# Patient Record
Sex: MALE | Race: WHITE | Employment: FULL TIME | URBAN - NONMETROPOLITAN AREA
[De-identification: names, ages, dates, MRNs, and addresses within clinical notes are randomized per-mention and may not be internally consistent; named-entity substitution may affect disease eponyms.]

---

## 2021-08-19 ENCOUNTER — OFFICE VISIT (OUTPATIENT)
Dept: PRIMARY CARE CLINIC | Age: 46
End: 2021-08-19
Payer: COMMERCIAL

## 2021-08-19 VITALS
WEIGHT: 233 LBS | SYSTOLIC BLOOD PRESSURE: 130 MMHG | TEMPERATURE: 97.5 F | BODY MASS INDEX: 30.88 KG/M2 | HEART RATE: 74 BPM | HEIGHT: 73 IN | OXYGEN SATURATION: 99 % | DIASTOLIC BLOOD PRESSURE: 66 MMHG

## 2021-08-19 DIAGNOSIS — M54.2 MUSCULOSKELETAL NECK PAIN: ICD-10-CM

## 2021-08-19 DIAGNOSIS — Z76.89 ESTABLISHING CARE WITH NEW DOCTOR, ENCOUNTER FOR: Primary | ICD-10-CM

## 2021-08-19 DIAGNOSIS — R53.83 FATIGUE, UNSPECIFIED TYPE: ICD-10-CM

## 2021-08-19 DIAGNOSIS — Z83.3 FAMILY HISTORY OF DIABETES MELLITUS: ICD-10-CM

## 2021-08-19 PROCEDURE — 99203 OFFICE O/P NEW LOW 30 MIN: CPT | Performed by: STUDENT IN AN ORGANIZED HEALTH CARE EDUCATION/TRAINING PROGRAM

## 2021-08-19 RX ORDER — TIZANIDINE 2 MG/1
2 TABLET ORAL NIGHTLY PRN
Qty: 10 TABLET | Refills: 0 | Status: SHIPPED | OUTPATIENT
Start: 2021-08-19

## 2021-08-19 ASSESSMENT — PATIENT HEALTH QUESTIONNAIRE - PHQ9
SUM OF ALL RESPONSES TO PHQ QUESTIONS 1-9: 0
1. LITTLE INTEREST OR PLEASURE IN DOING THINGS: 0
SUM OF ALL RESPONSES TO PHQ9 QUESTIONS 1 & 2: 0
2. FEELING DOWN, DEPRESSED OR HOPELESS: 0
SUM OF ALL RESPONSES TO PHQ QUESTIONS 1-9: 0
SUM OF ALL RESPONSES TO PHQ QUESTIONS 1-9: 0

## 2021-08-19 NOTE — PROGRESS NOTES
[address] [facility]  [address] [place] [address] S[place] Solis [address]  Dept: [phone]  Dept Fax: [phone]  Loc: [phone]      Subjective:     Chief Complaint   Patient presents with    New Patient    Shoulder Pain       HPI:  Silvestre Khan is a 40 y.o. male presenting for    Here to establish care. No known chronic medical problems. Doesn't take any medications. H/o lower lumbar discectomy. Doesn't smoke, moderate etoh use. No recreational drugs. Other complaint is shoulder pain as noted below. R shoulder problem  -Started yesterday. No trauma. No previous injury  -Movement makes worse  -Alleviated by rest  -Neck is stiff as well  -Took some ibuprofen last night  -Is right handed  -Works in food processing      ROS:   Review of Systems  Reviewed with patient and noted to be negative except that listed in HPI    PMHx:  No past medical history on file. There is no problem list on file for this patient. 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 Objective:   PE:  /66   Pulse 74   Temp 97.5 °F (36.4 °C)   Ht 6' 1\" (1.854 m)   Wt 233 lb (105.7 kg)   SpO2 99%   BMI 30.74 kg/m²   Physical Exam  Constitutional:       General: He is not in acute distress. Appearance: Normal appearance. HENT:      Head: Normocephalic. Nose: Nose normal.      Mouth/Throat:      Mouth: Mucous membranes are moist.      Pharynx: Oropharynx is clear. No oropharyngeal exudate or posterior oropharyngeal erythema. Eyes:      General: No scleral icterus. Extraocular Movements: Extraocular movements intact. Conjunctiva/sclera: Conjunctivae normal.   Cardiovascular:      Rate and Rhythm: Normal rate and regular rhythm. Pulses: Normal pulses. Heart sounds: No murmur heard. Pulmonary:      Effort: Pulmonary effort is normal.      Breath sounds: Normal breath sounds. Abdominal:      General: There is no distension. Palpations: Abdomen is soft. There is no mass. Tenderness: There is no abdominal tenderness. Musculoskeletal:         General: Normal range of motion. Cervical back: Normal range of motion. Comments: Area of indicated tenderness is R upper trapezius group, not shoulder. Provocative shoulder exams negative and do not reproduce sx. R shoulder FROM   Skin:     General: Skin is warm and dry. Capillary Refill: Capillary refill takes less than 2 seconds. Neurological:      General: No focal deficit present. Mental Status: He is alert and oriented to person, place, and time. Psychiatric:         Mood and Affect: Mood normal.         Behavior: Behavior normal.         Thought Content: Thought content normal.            Assessment & Plan   Agnes Patel was seen today for new patient and shoulder pain.     Diagnoses and all orders for this visit:    Establishing care with new doctor, encounter for    Musculoskeletal neck pain  -Pain appears more muscular in upper back/neck rather than shoulder. Treat w/ APAP, NSAID, muscle relaxer, stretches, warm pad. -     tiZANidine (ZANAFLEX) 2 MG tablet; Take 1 tablet by mouth nightly as needed (muscle spasms)    Family history of diabetes mellitus  -     Hemoglobin A1C; Future    Fatigue, unspecified type  -     Comprehensive Metabolic Panel; Future  -     Lipid, Fasting; Future  -     Hemoglobin A1C; Future  -     TSH WITH REFLEX TO FT4; Future  -     CBC Auto Differential; Future      Return for f/u 1-2 weeks for lab discussion/neck f/u. All questions were answered. Medications, including possible adverse effects, and instructions were reviewed and  understanding was confirmed. Follow-up recommendations, including when to contact or return to office (ie; if symptoms worsen or fail to improve), were discussed and acknowledged.     Electronically signed by Ted Amezcua MD on 8/19/21 at 1:35 PM CDT

## 2021-08-19 NOTE — PATIENT INSTRUCTIONS
Tylenol: max dose 3000mg daily  Ibuprofen max dose 2400mg daily  Take zanaflex in evening  Try warm compress  Patient Education        Neck: Exercises  Introduction  Here are some examples of exercises for you to try. The exercises may be suggested for a condition or for rehabilitation. Start each exercise slowly. Ease off the exercises if you start to have pain. You will be told when to start these exercises and which ones will work best for you. How to do the exercises  Neck stretch   1. This stretch works best if you keep your shoulder down as you lean away from it. To help you remember to do this, start by relaxing your shoulders and lightly holding on to your thighs or your chair. 2. Tilt your head toward your shoulder and hold for 15 to 30 seconds. Let the weight of your head stretch your muscles. 3. If you would like a little added stretch, use your hand to gently and steadily pull your head toward your shoulder. For example, keeping your right shoulder down, lean your head to the left. 4. Repeat 2 to 4 times toward each shoulder. Diagonal neck stretch   1. Turn your head slightly toward the direction you will be stretching, and tilt your head diagonally toward your chest and hold for 15 to 30 seconds. 2. If you would like a little added stretch, use your hand to gently and steadily pull your head forward on the diagonal.  3. Repeat 2 to 4 times toward each side. Dorsal glide stretch   The dorsal glide stretches the back of the neck. If you feel pain, do not glide so far back. Some people find this exercise easier to do while lying on their backs with an ice pack on the neck. 1. Sit or stand tall and look straight ahead. 2. Slowly tuck your chin as you glide your head backward over your body  3. Hold for a count of 6, and then relax for up to 10 seconds. 4. Repeat 8 to 12 times. Chest and shoulder stretch   1.  Sit or stand tall and glide your head backward as in the dorsal glide stretch. 2. Raise both arms so that your hands are next to your ears. 3. Take a deep breath, and as you breathe out, lower your elbows down and behind your back. You will feel your shoulder blades slide down and together, and at the same time you will feel a stretch across your chest and the front of your shoulders. 4. Hold for about 6 seconds, and then relax for up to 10 seconds. 5. Repeat 8 to 12 times. Strengthening: Hands on head   1. Move your head backward, forward, and side to side against gentle pressure from your hands, holding each position for about 6 seconds. 2. Repeat 8 to 12 times. Follow-up care is a key part of your treatment and safety. Be sure to make and go to all appointments, and call your doctor if you are having problems. It's also a good idea to know your test results and keep a list of the medicines you take. Where can you learn more? Go to [email or web address]://[email or web address] org and sign in to your [employer] account. Enter P975 in the [employer] box to learn more about \"Neck: Exercises. \"     If you do not have an account, please click on the \"Sign Up Now\" link. Current as of: November 16, 2020               Content Version: 12.9  © 2006-2021 [employer]. Care instructions adapted under license by [facility] ([facility]). If you have questions about a medical condition or this instruction, always ask your healthcare professional. [place] [address] any warranty or liability for your use of this information.

## 2023-12-14 ENCOUNTER — TELEPHONE (OUTPATIENT)
Dept: HEMATOLOGY | Age: 48
End: 2023-12-14

## 2023-12-14 NOTE — TELEPHONE ENCOUNTER
Called pt. to remind them of appointment on 12/15/23 and had to leave a detailed voicemail with appointment date and time.

## 2023-12-14 NOTE — PROGRESS NOTES
nodule  Continue to follow with other medical providers as recommended  Labs at next visit: CBC, CMP and ferritin level    EMR Dragon/Transcription disclaimer:   Much of this encounter note is an electronic transcription/translation of spoken language to printed text. The electronic translation of spoken language may permit erroneous, or at times, nonsensical words or phrases to be inadvertently transcribed; although attempts have made to review the note for such errors, some may still exist.  Please excuse any unrecognized transcription errors and contact us if the error is unintelligible or needs documented correction. Also, portions of this note have been copied forward, however, changed to reflect the most current clinical status of this patient. I spent a total of 74 minutes on this encounter on 12/15/2023 to include review of labs, radiographic imaging, previous provider documentation, performing medically appropriate examination, ordering tests, ordering medication, patient education and documenting clinical information in the electronic medical record. Electronically signed by ASHLEY Tate on 12/15/2023 at 4:56 PM  Ana BULL am starting this note as a registered nurse for [facility] ASHLEY Maza.

## 2023-12-15 ENCOUNTER — OFFICE VISIT (OUTPATIENT)
Dept: HEMATOLOGY | Age: 48
End: 2023-12-15
Payer: OTHER GOVERNMENT

## 2023-12-15 ENCOUNTER — HOSPITAL ENCOUNTER (OUTPATIENT)
Dept: INFUSION THERAPY | Age: 48
Discharge: HOME | End: 2023-12-15
Payer: OTHER GOVERNMENT

## 2023-12-15 VITALS
SYSTOLIC BLOOD PRESSURE: 128 MMHG | HEART RATE: 70 BPM | HEIGHT: 72 IN | DIASTOLIC BLOOD PRESSURE: 82 MMHG | BODY MASS INDEX: 32.18 KG/M2 | OXYGEN SATURATION: 99 % | WEIGHT: 237.6 LBS | TEMPERATURE: 98.1 F

## 2023-12-15 DIAGNOSIS — R79.89 ELEVATED FERRITIN: ICD-10-CM

## 2023-12-15 DIAGNOSIS — R53.83 OTHER FATIGUE: ICD-10-CM

## 2023-12-15 DIAGNOSIS — I82.432 ACUTE DEEP VEIN THROMBOSIS (DVT) OF POPLITEAL VEIN OF LEFT LOWER EXTREMITY (HCC): ICD-10-CM

## 2023-12-15 DIAGNOSIS — R91.1 NODULE OF UPPER LOBE OF RIGHT LUNG: ICD-10-CM

## 2023-12-15 DIAGNOSIS — I82.432 ACUTE DEEP VEIN THROMBOSIS (DVT) OF POPLITEAL VEIN OF LEFT LOWER EXTREMITY (HCC): Primary | ICD-10-CM

## 2023-12-15 LAB
ALBUMIN SERPL-MCNC: 4.9 G/DL (ref 3.5–5.2)
ALP SERPL-CCNC: 82 U/L (ref 40–130)
ALT SERPL-CCNC: 63 U/L (ref 21–72)
ANION GAP SERPL CALCULATED.3IONS-SCNC: 10 MMOL/L (ref 7–19)
AST SERPL-CCNC: 49 U/L (ref 17–59)
BASOPHILS # BLD: 0.02 K/UL (ref 0.01–0.08)
BASOPHILS NFR BLD: 0.4 % (ref 0.1–1.2)
BILIRUB SERPL-MCNC: 1.2 MG/DL (ref 0.2–1.3)
BUN SERPL-MCNC: 16 MG/DL (ref 9–20)
CALCIUM SERPL-MCNC: 9.2 MG/DL (ref 8.4–10.2)
CHLORIDE SERPL-SCNC: 103 MMOL/L (ref 98–111)
CO2 SERPL-SCNC: 28 MMOL/L (ref 22–29)
CREAT SERPL-MCNC: 0.8 MG/DL (ref 0.6–1.2)
CRP SERPL HS-MCNC: 0.42 MG/DL (ref 0–0.5)
D DIMER PPP FEU-MCNC: 1.76 UG/ML FEU (ref 0–0.48)
EOSINOPHIL # BLD: 0.06 K/UL (ref 0.04–0.54)
EOSINOPHIL NFR BLD: 1.1 % (ref 0.7–7)
ERYTHROCYTE [DISTWIDTH] IN BLOOD BY AUTOMATED COUNT: 12.6 % (ref 11.6–14.4)
ERYTHROCYTE [SEDIMENTATION RATE] IN BLOOD BY WESTERGREN METHOD: 20 MM/HR (ref 0–10)
FERRITIN SERPL-MCNC: 491 NG/ML (ref 30–400)
FOLATE SERPL-MCNC: 6.9 NG/ML (ref 4.5–32.2)
GLOBULIN: 3.9 G/DL
GLUCOSE SERPL-MCNC: 125 MG/DL (ref 74–106)
HCT VFR BLD AUTO: 44.4 % (ref 40.1–51)
HGB BLD-MCNC: 14.9 G/DL (ref 13.7–17.5)
IRON SATN MFR SERPL: 32 % (ref 14–50)
IRON SERPL-MCNC: 96 UG/DL (ref 59–158)
LYMPHOCYTES # BLD: 2.41 K/UL (ref 1.18–3.74)
LYMPHOCYTES NFR BLD: 45.2 % (ref 19.3–53.1)
MCH RBC QN AUTO: 26.3 PG (ref 25.7–32.2)
MCHC RBC AUTO-ENTMCNC: 33.6 G/DL (ref 32.3–36.5)
MCV RBC AUTO: 78.4 FL (ref 79–92.2)
MONOCYTES # BLD: 0.45 K/UL (ref 0.24–0.82)
MONOCYTES NFR BLD: 8.4 % (ref 4.7–12.5)
NEUTROPHILS # BLD: 2.38 K/UL (ref 1.56–6.13)
NEUTS SEG NFR BLD: 44.7 % (ref 34–71.1)
PLATELET # BLD AUTO: 162 K/UL (ref 163–337)
PMV BLD AUTO: 10.6 FL (ref 7.4–10.4)
POTASSIUM SERPL-SCNC: 4.6 MMOL/L (ref 3.5–5.1)
PROT SERPL-MCNC: 8.7 G/DL (ref 6.3–8.2)
RBC # BLD AUTO: 5.66 M/UL (ref 4.63–6.08)
SODIUM SERPL-SCNC: 141 MMOL/L (ref 137–145)
TIBC SERPL-MCNC: 298 UG/DL (ref 250–400)
VIT B12 SERPL-MCNC: 683 PG/ML (ref 211–946)
WBC # BLD AUTO: 5.33 K/UL (ref 4.23–9.07)

## 2023-12-15 PROCEDURE — 85025 COMPLETE CBC W/AUTO DIFF WBC: CPT

## 2023-12-15 PROCEDURE — 80053 COMPREHEN METABOLIC PANEL: CPT

## 2023-12-15 PROCEDURE — 36415 COLL VENOUS BLD VENIPUNCTURE: CPT

## 2023-12-15 PROCEDURE — 99205 OFFICE O/P NEW HI 60 MIN: CPT | Performed by: NURSE PRACTITIONER

## 2023-12-15 RX ORDER — RIVAROXABAN 15 MG/1
TABLET, FILM COATED ORAL
COMMUNITY
Start: 2023-12-08

## 2023-12-15 ASSESSMENT — PROMIS GLOBAL HEALTH SCALE
IN GENERAL, HOW WOULD YOU RATE YOUR PHYSICAL HEALTH [ON A SCALE OF 1 (POOR) TO 5 (EXCELLENT)]?: 2
IN THE PAST 7 DAYS, HOW OFTEN HAVE YOU BEEN BOTHERED BY EMOTIONAL PROBLEMS, SUCH AS FEELING ANXIOUS, DEPRESSED, OR IRRITABLE [ON A SCALE FROM 1 (NEVER) TO 5 (ALWAYS)]?: 5
IN THE PAST 7 DAYS, HOW WOULD YOU RATE YOUR PAIN ON AVERAGE [ON A SCALE FROM 0 (NO PAIN) TO 10 (WORST IMAGINABLE PAIN)]?: 8
IN GENERAL, HOW WOULD YOU RATE YOUR SATISFACTION WITH YOUR SOCIAL ACTIVITIES AND RELATIONSHIPS [ON A SCALE OF 1 (POOR) TO 5 (EXCELLENT)]?: 4
IN THE PAST 7 DAYS, HOW WOULD YOU RATE YOUR FATIGUE ON AVERAGE [ON A SCALE FROM 1 (NONE) TO 5 (VERY SEVERE)]?: 3
SUM OF RESPONSES TO QUESTIONS 3, 6, 7, & 8: 18
IN GENERAL, WOULD YOU SAY YOUR QUALITY OF LIFE IS...[ON A SCALE OF 1 (POOR) TO 5 (EXCELLENT)]: 2
TO WHAT EXTENT ARE YOU ABLE TO CARRY OUT YOUR EVERYDAY PHYSICAL ACTIVITIES SUCH AS WALKING, CLIMBING STAIRS, CARRYING GROCERIES, OR MOVING A CHAIR [ON A SCALE OF 1 (NOT AT ALL) TO 5 (COMPLETELY)]?: 5
SUM OF RESPONSES TO QUESTIONS 2, 4, 5, & 10: 15
IN GENERAL, PLEASE RATE HOW WELL YOU CARRY OUT YOUR USUAL SOCIAL ACTIVITIES (INCLUDES ACTIVITIES AT HOME, AT WORK, AND IN YOUR COMMUNITY, AND RESPONSIBILITIES AS A PARENT, CHILD, SPOUSE, EMPLOYEE, FRIEND, ETC) [ON A SCALE OF 1 (POOR) TO 5 (EXCELLENT)]?: 4
IN GENERAL, WOULD YOU SAY YOUR HEALTH IS...[ON A SCALE OF 1 (POOR) TO 5 (EXCELLENT)]: 3
IN GENERAL, HOW WOULD YOU RATE YOUR MENTAL HEALTH, INCLUDING YOUR MOOD AND YOUR ABILITY TO THINK [ON A SCALE OF 1 (POOR) TO 5 (EXCELLENT)]?: 4

## 2023-12-22 LAB
APCR PPP: 4.36 {RATIO}
APTT IMM NP PPP: 90 SEC (ref 32–48)
APTT P HEP NEUT PPP: ABNORMAL SEC (ref 32–48)
AT III ACT/NOR PPP CHRO: 106 % (ref 76–128)
B2 GLYCOPROT1 IGG SERPL IA-ACNC: 65 SGU
B2 GLYCOPROT1 IGM SERPL IA-ACNC: <10 SMU
CARDIOLIPIN IGG SER IA-ACNC: 46 GPL
CARDIOLIPIN IGM SER IA-ACNC: 11 MPL
CONFIRM APTT STACLOT: ABNORMAL
DRVVT SCREEN TO CONFIRM RATIO: POSITIVE RATIO
F2 C.20210G>A GENO BLD/T: NEGATIVE
F5 P.R506Q BLD/T QL: ABNORMAL
HCYS SERPL-SCNC: 11 UMOL/L (ref 0–15)
LA 3 SCREEN W REFLEX-IMP: ABNORMAL
LA NT DPL PPP QL: POSITIVE
MIXING DRVVT: 93 SEC (ref 33–44)
PROT C ACT/NOR PPP: 136 % (ref 83–168)
PROT S FREE AG ACT/NOR PPP IA: 96 % (ref 74–147)
PROTHROMBIN TIME: 17.1 SEC (ref 12–15.5)
REPTILASE TIME: ABNORMAL SEC
SCREEN APTT: 146 SEC (ref 32–48)
SCREEN DRVVT: 139 SEC (ref 33–44)
SPECIMEN SOURCE: ABNORMAL
SPECIMEN SOURCE: ABNORMAL
THROMBIN TIME: 18.3 SEC (ref 14.7–19.5)
THROMBOSIS INTERPRETATION: ABNORMAL

## 2023-12-26 ENCOUNTER — TELEPHONE (OUTPATIENT)
Dept: HEMATOLOGY | Age: 48
End: 2023-12-26

## 2023-12-26 ENCOUNTER — HOSPITAL ENCOUNTER (OUTPATIENT)
Dept: INFUSION THERAPY | Age: 48
Discharge: HOME | End: 2023-12-26
Payer: OTHER GOVERNMENT

## 2023-12-26 DIAGNOSIS — Z79.01 MONITORING FOR LONG-TERM ANTICOAGULANT USE: ICD-10-CM

## 2023-12-26 DIAGNOSIS — Z51.81 MONITORING FOR LONG-TERM ANTICOAGULANT USE: ICD-10-CM

## 2023-12-26 DIAGNOSIS — D68.61 ANTIPHOSPHOLIPID SYNDROME (HCC): Primary | ICD-10-CM

## 2023-12-26 DIAGNOSIS — I82.432 ACUTE DEEP VEIN THROMBOSIS (DVT) OF POPLITEAL VEIN OF LEFT LOWER EXTREMITY (HCC): ICD-10-CM

## 2023-12-26 DIAGNOSIS — D68.61 ANTIPHOSPHOLIPID SYNDROME (HCC): ICD-10-CM

## 2023-12-26 PROCEDURE — 85610 PROTHROMBIN TIME: CPT

## 2023-12-26 RX ORDER — ENOXAPARIN SODIUM 100 MG/ML
100 INJECTION SUBCUTANEOUS 2 TIMES DAILY
Qty: 10 EACH | Refills: 1 | Status: SHIPPED | OUTPATIENT
Start: 2023-12-26 | End: 2023-12-28 | Stop reason: SDUPTHER

## 2023-12-26 RX ORDER — WARFARIN SODIUM 5 MG/1
5 TABLET ORAL DAILY
Qty: 30 TABLET | Refills: 3 | Status: SHIPPED | OUTPATIENT
Start: 2023-12-26 | End: 2023-12-28 | Stop reason: SDUPTHER

## 2023-12-26 NOTE — PROGRESS NOTES
Spoke with Mr. David Hidalgo related to lab results indicating antiphospholipid syndrome. Discussed the need to be on chronic anticoagulation for life with his unprovoked DVT and the finding of antiphospholipid syndrome, positive lupus anticoagulant beta IgG and anticardiolipin IgG. Will need to discontinue Xarelto and begin bridging with Lovenox to Coumadin. Electronic prescription sent for Lovenox 100 mg subcu every 12 hours x 5 days along with Coumadin 5 mg daily. Mr. David Hidalgo will come to the clinic today at 4:00 for an INR and KATHY Mitchell will provide him with Coumadin teaching/INR monitoring.

## 2023-12-26 NOTE — TELEPHONE ENCOUNTER
Attempted to contact . Kat Kaufman related to his lab results indicating positive for lupus anticoagulant with an elevated beta-2 glycoprotein and anticardiolipin antibody, this suggest a diagnosis of antiphospholipid syndrome. Unfortunately anticoagulation with Xarelto is not recommended with antiphospholipid syndrome and he will need to transition to Coumadin. Will need to obtain an INR and then began Lovenox with Coumadin bridging and discontinue Xarelto.

## 2023-12-28 DIAGNOSIS — I82.432 ACUTE DEEP VEIN THROMBOSIS (DVT) OF POPLITEAL VEIN OF LEFT LOWER EXTREMITY (HCC): ICD-10-CM

## 2023-12-28 DIAGNOSIS — D68.61 ANTIPHOSPHOLIPID SYNDROME (HCC): ICD-10-CM

## 2023-12-28 RX ORDER — ENOXAPARIN SODIUM 100 MG/ML
100 INJECTION SUBCUTANEOUS 2 TIMES DAILY
Qty: 10 EACH | Refills: 1 | Status: SHIPPED | OUTPATIENT
Start: 2023-12-28

## 2023-12-28 RX ORDER — WARFARIN SODIUM 5 MG/1
5 TABLET ORAL DAILY
Qty: 30 TABLET | Refills: 3 | Status: SHIPPED | OUTPATIENT
Start: 2023-12-28

## 2024-01-02 ENCOUNTER — HOSPITAL ENCOUNTER (OUTPATIENT)
Dept: INFUSION THERAPY | Age: 49
Discharge: HOME | End: 2024-01-02
Payer: OTHER GOVERNMENT

## 2024-01-02 ENCOUNTER — ANTI-COAG VISIT (OUTPATIENT)
Dept: HEMATOLOGY | Age: 49
End: 2024-01-02
Payer: COMMERCIAL

## 2024-01-02 DIAGNOSIS — Z51.81 MONITORING FOR LONG-TERM ANTICOAGULANT USE: ICD-10-CM
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
## 2024-01-02 DIAGNOSIS — D68.61 ANTIPHOSPHOLIPID SYNDROME (HCC): ICD-10-CM
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
## 2024-01-02 DIAGNOSIS — S80.861A TICK BITE OF RIGHT LOWER LEG, INITIAL ENCOUNTER: ICD-10-CM

## 2024-01-02 DIAGNOSIS — R76.0 ANTIPHOSPHOLIPID ANTIBODY POSITIVE: Primary | ICD-10-CM

## 2024-01-02 DIAGNOSIS — Z79.01 MONITORING FOR LONG-TERM ANTICOAGULANT USE: ICD-10-CM

## 2024-01-02 DIAGNOSIS — S80.861A TICK BITE OF RIGHT LOWER LEG, INITIAL ENCOUNTER: Primary | ICD-10-CM

## 2024-01-02 LAB
INTERNATIONAL NORMALIZATION RATIO, POC: 2.2
PROTHROMBIN TIME, POC: NORMAL

## 2024-01-02 PROCEDURE — 93793 ANTICOAG MGMT PT WARFARIN: CPT | Performed by: NURSE PRACTITIONER

## 2024-01-02 PROCEDURE — 85610 PROTHROMBIN TIME: CPT | Performed by: NURSE PRACTITIONER

## 2024-01-02 NOTE — PROGRESS NOTES
Anticoagulation Encounter     Indication for Warfarin/Diagnosis for anticoagulation -antiphospholipid syndrome and unprovoked left lower extremity DVT  INR -2.2  Goal INR- 2-3  Current dose -Lovenox 100 mg SQ every 12 hours and Coumadin 5 mg nightly  Patient instructions -discontinue Lovenox and continue Coumadin 5 mg p.o. nightly  Next PT/INR  - 1 week    Stephen Forman RN discussed recommendations with patient.

## 2024-01-04 LAB
B BURGDOR IGG SER QL IB: NEGATIVE
B BURGDOR IGM SER QL IB: NEGATIVE

## 2024-01-09 ENCOUNTER — ANTI-COAG VISIT (OUTPATIENT)
Dept: HEMATOLOGY | Age: 49
End: 2024-01-09
Payer: COMMERCIAL

## 2024-01-09 ENCOUNTER — HOSPITAL ENCOUNTER (OUTPATIENT)
Dept: INFUSION THERAPY | Age: 49
Discharge: HOME | End: 2024-01-09
Payer: OTHER GOVERNMENT

## 2024-01-09 DIAGNOSIS — R76.0 ANTIPHOSPHOLIPID ANTIBODY POSITIVE: Primary | ICD-10-CM

## 2024-01-09 DIAGNOSIS — Z51.81 MONITORING FOR LONG-TERM ANTICOAGULANT USE: ICD-10-CM

## 2024-01-09 DIAGNOSIS — I82.432 ACUTE DEEP VEIN THROMBOSIS (DVT) OF POPLITEAL VEIN OF LEFT LOWER EXTREMITY (HCC): ICD-10-CM

## 2024-01-09 DIAGNOSIS — R79.89 ELEVATED FERRITIN: ICD-10-CM

## 2024-01-09 DIAGNOSIS — D68.61 ANTIPHOSPHOLIPID SYNDROME (HCC): ICD-10-CM

## 2024-01-09 DIAGNOSIS — Z79.01 MONITORING FOR LONG-TERM ANTICOAGULANT USE: ICD-10-CM

## 2024-01-09 LAB
INTERNATIONAL NORMALIZATION RATIO, POC: 3.2
PROTHROMBIN TIME, POC: NORMAL

## 2024-01-09 PROCEDURE — 93793 ANTICOAG MGMT PT WARFARIN: CPT | Performed by: NURSE PRACTITIONER

## 2024-01-09 PROCEDURE — 85610 PROTHROMBIN TIME: CPT | Performed by: NURSE PRACTITIONER

## 2024-01-09 NOTE — PROGRESS NOTES
Anticoagulation Encounter     Indication for Warfarin/Diagnosis for anticoagulation -antiphospholipid syndrome and unprovoked left lower extremity DVT  INR -3.2  Goal INR- 2-3  Current dose -Coumadin 5 mg nightly  Patient instructions -hold and have greens tonight, Coumadin 2.5 mg on Wednesday and Coumadin 5 mg p.o. nightly the rest of the week  Next PT/INR  - 1 week    Stephen Forman RN discussed recommendations with patient.

## 2024-01-16 ENCOUNTER — ANTI-COAG VISIT (OUTPATIENT)
Dept: HEMATOLOGY | Age: 49
End: 2024-01-16

## 2024-01-16 ENCOUNTER — HOSPITAL ENCOUNTER (OUTPATIENT)
Dept: INFUSION THERAPY | Age: 49
Discharge: HOME | End: 2024-01-16
Payer: OTHER GOVERNMENT

## 2024-01-16 DIAGNOSIS — I82.432 ACUTE DEEP VEIN THROMBOSIS (DVT) OF POPLITEAL VEIN OF LEFT LOWER EXTREMITY (HCC): ICD-10-CM

## 2024-01-16 DIAGNOSIS — Z79.01 MONITORING FOR LONG-TERM ANTICOAGULANT USE: ICD-10-CM

## 2024-01-16 DIAGNOSIS — Z51.81 MONITORING FOR LONG-TERM ANTICOAGULANT USE: ICD-10-CM

## 2024-01-16 DIAGNOSIS — D68.61 ANTIPHOSPHOLIPID SYNDROME (HCC): ICD-10-CM

## 2024-01-16 DIAGNOSIS — R76.0 ANTIPHOSPHOLIPID ANTIBODY POSITIVE: Primary | ICD-10-CM

## 2024-01-16 LAB
INTERNATIONAL NORMALIZATION RATIO, POC: 3.5
PROTHROMBIN TIME, POC: NORMAL

## 2024-01-16 PROCEDURE — 85610 PROTHROMBIN TIME: CPT | Performed by: NURSE PRACTITIONER

## 2024-01-16 NOTE — PROGRESS NOTES
Anticoagulation Encounter     Indication for Warfarin/Diagnosis for anticoagulation -antiphospholipid syndrome and unprovoked left lower extremity DVT  INR -3.5  Goal INR- 2-3  Current dose -Coumadin 2.5 mg on Wednesday and Coumadin 5 mg p.o. nightly the rest of the week  Patient instructions -Coumadin 2.5 mg on Wednesday and Saturday then Coumadin 5 mg p.o. nightly the rest of the week  Next PT/INR  - 1 week:1/23/2024    Stephen Forman RN discussed recommendations with patient.

## 2024-01-19 DIAGNOSIS — R74.8 ELEVATED LIVER ENZYMES: Primary | ICD-10-CM

## 2024-01-19 NOTE — PROGRESS NOTES
on 1/23/2024 at 9:42 AM  Stephen BULL, am starting this note as a registered nurse for ASHLEY Sylvester.      No

## 2024-01-22 ENCOUNTER — TELEPHONE (OUTPATIENT)
Dept: HEMATOLOGY | Age: 49
End: 2024-01-22

## 2024-01-22 NOTE — TELEPHONE ENCOUNTER
Called patient to remind of appointment on 1/22/2024 and was unable to leave a message or talk to patient due the phone kept ringing and no one ever answered or no option to leave voicemail.

## 2024-01-23 ENCOUNTER — HOSPITAL ENCOUNTER (OUTPATIENT)
Dept: INFUSION THERAPY | Age: 49
Discharge: HOME | End: 2024-01-23
Payer: OTHER GOVERNMENT

## 2024-01-23 ENCOUNTER — OFFICE VISIT (OUTPATIENT)
Dept: HEMATOLOGY | Age: 49
End: 2024-01-23

## 2024-01-23 VITALS
HEIGHT: 72 IN | TEMPERATURE: 97.6 F | WEIGHT: 236 LBS | HEART RATE: 73 BPM | DIASTOLIC BLOOD PRESSURE: 90 MMHG | OXYGEN SATURATION: 97 % | SYSTOLIC BLOOD PRESSURE: 130 MMHG | BODY MASS INDEX: 31.97 KG/M2

## 2024-01-23 DIAGNOSIS — Z79.01 MONITORING FOR LONG-TERM ANTICOAGULANT USE: ICD-10-CM

## 2024-01-23 DIAGNOSIS — R91.1 NODULE OF UPPER LOBE OF RIGHT LUNG: ICD-10-CM

## 2024-01-23 DIAGNOSIS — R79.89 ELEVATED FERRITIN: ICD-10-CM

## 2024-01-23 DIAGNOSIS — D68.61 ANTIPHOSPHOLIPID SYNDROME (HCC): ICD-10-CM

## 2024-01-23 DIAGNOSIS — R79.89 ELEVATED FERRITIN: Primary | ICD-10-CM

## 2024-01-23 DIAGNOSIS — Z51.81 MONITORING FOR LONG-TERM ANTICOAGULANT USE: ICD-10-CM

## 2024-01-23 DIAGNOSIS — R74.8 ELEVATED LIVER ENZYMES: ICD-10-CM

## 2024-01-23 DIAGNOSIS — I82.432 ACUTE DEEP VEIN THROMBOSIS (DVT) OF POPLITEAL VEIN OF LEFT LOWER EXTREMITY (HCC): ICD-10-CM

## 2024-01-23 LAB
ALBUMIN SERPL-MCNC: 4.6 G/DL (ref 3.5–5.2)
ALP SERPL-CCNC: 86 U/L (ref 40–130)
ALT SERPL-CCNC: 55 U/L (ref 21–72)
ANION GAP SERPL CALCULATED.3IONS-SCNC: 12 MMOL/L (ref 7–19)
AST SERPL-CCNC: 45 U/L (ref 17–59)
BASOPHILS # BLD: 0.02 K/UL (ref 0.01–0.08)
BASOPHILS NFR BLD: 0.3 % (ref 0.1–1.2)
BILIRUB SERPL-MCNC: 1.2 MG/DL (ref 0.2–1.3)
BUN SERPL-MCNC: 18 MG/DL (ref 9–20)
CALCIUM SERPL-MCNC: 8.5 MG/DL (ref 8.4–10.2)
CHLORIDE SERPL-SCNC: 106 MMOL/L (ref 98–111)
CO2 SERPL-SCNC: 23 MMOL/L (ref 22–29)
CREAT SERPL-MCNC: 0.8 MG/DL (ref 0.6–1.2)
EOSINOPHIL # BLD: 0.08 K/UL (ref 0.04–0.54)
EOSINOPHIL NFR BLD: 1.3 % (ref 0.7–7)
ERYTHROCYTE [DISTWIDTH] IN BLOOD BY AUTOMATED COUNT: 12.7 % (ref 11.6–14.4)
GLOBULIN: 3.8 G/DL
GLUCOSE SERPL-MCNC: 117 MG/DL (ref 74–106)
HCT VFR BLD AUTO: 43.7 % (ref 40.1–51)
HGB BLD-MCNC: 14.9 G/DL (ref 13.7–17.5)
INTERNATIONAL NORMALIZATION RATIO, POC: 2.1
LYMPHOCYTES # BLD: 2.89 K/UL (ref 1.18–3.74)
LYMPHOCYTES NFR BLD: 48.5 % (ref 19.3–53.1)
MCH RBC QN AUTO: 26.5 PG (ref 25.7–32.2)
MCHC RBC AUTO-ENTMCNC: 34.1 G/DL (ref 32.3–36.5)
MCV RBC AUTO: 77.6 FL (ref 79–92.2)
MONOCYTES # BLD: 0.44 K/UL (ref 0.24–0.82)
MONOCYTES NFR BLD: 7.4 % (ref 4.7–12.5)
NEUTROPHILS # BLD: 2.52 K/UL (ref 1.56–6.13)
NEUTS SEG NFR BLD: 42.3 % (ref 34–71.1)
PLATELET # BLD AUTO: 217 K/UL (ref 163–337)
PMV BLD AUTO: 10.5 FL (ref 7.4–10.4)
POTASSIUM SERPL-SCNC: 4.3 MMOL/L (ref 3.5–5.1)
PROT SERPL-MCNC: 8.4 G/DL (ref 6.3–8.2)
PROTHROMBIN TIME, POC: NORMAL
RBC # BLD AUTO: 5.63 M/UL (ref 4.63–6.08)
SODIUM SERPL-SCNC: 141 MMOL/L (ref 137–145)
WBC # BLD AUTO: 5.96 K/UL (ref 4.23–9.07)

## 2024-01-23 PROCEDURE — 85610 PROTHROMBIN TIME: CPT | Performed by: NURSE PRACTITIONER

## 2024-01-23 PROCEDURE — 99212 OFFICE O/P EST SF 10 MIN: CPT

## 2024-01-23 PROCEDURE — 80053 COMPREHEN METABOLIC PANEL: CPT

## 2024-01-23 PROCEDURE — 99214 OFFICE O/P EST MOD 30 MIN: CPT | Performed by: NURSE PRACTITIONER

## 2024-01-23 PROCEDURE — 36415 COLL VENOUS BLD VENIPUNCTURE: CPT

## 2024-01-23 PROCEDURE — 85025 COMPLETE CBC W/AUTO DIFF WBC: CPT

## 2024-01-23 ASSESSMENT — ENCOUNTER SYMPTOMS
ABDOMINAL PAIN: 0
VOMITING: 0
BLOOD IN STOOL: 0
EYE REDNESS: 0
COUGH: 0
CONSTIPATION: 0
GASTROINTESTINAL NEGATIVE: 1
RESPIRATORY NEGATIVE: 1
EYES NEGATIVE: 1
NAUSEA: 0
SORE THROAT: 0
BACK PAIN: 0
SHORTNESS OF BREATH: 0
EYE DISCHARGE: 0
WHEEZING: 0
EYE PAIN: 0
DIARRHEA: 0

## 2024-02-06 ENCOUNTER — ANTI-COAG VISIT (OUTPATIENT)
Dept: HEMATOLOGY | Age: 49
End: 2024-02-06
Payer: OTHER GOVERNMENT

## 2024-02-06 ENCOUNTER — HOSPITAL ENCOUNTER (OUTPATIENT)
Dept: INFUSION THERAPY | Age: 49
Discharge: HOME | End: 2024-02-06
Payer: OTHER GOVERNMENT

## 2024-02-06 DIAGNOSIS — Z79.01 MONITORING FOR LONG-TERM ANTICOAGULANT USE: ICD-10-CM

## 2024-02-06 DIAGNOSIS — I82.432 ACUTE DEEP VEIN THROMBOSIS (DVT) OF POPLITEAL VEIN OF LEFT LOWER EXTREMITY (HCC): ICD-10-CM

## 2024-02-06 DIAGNOSIS — R79.89 ELEVATED FERRITIN: ICD-10-CM

## 2024-02-06 DIAGNOSIS — D68.61 ANTIPHOSPHOLIPID SYNDROME (HCC): ICD-10-CM

## 2024-02-06 DIAGNOSIS — D68.61 ANTIPHOSPHOLIPID SYNDROME (HCC): Primary | ICD-10-CM

## 2024-02-06 DIAGNOSIS — Z51.81 MONITORING FOR LONG-TERM ANTICOAGULANT USE: ICD-10-CM

## 2024-02-06 LAB
FERRITIN SERPL-MCNC: 380.3 NG/ML (ref 30–400)
INTERNATIONAL NORMALIZATION RATIO, POC: 2.5
PROTHROMBIN TIME, POC: 0

## 2024-02-06 PROCEDURE — 85610 PROTHROMBIN TIME: CPT | Performed by: NURSE PRACTITIONER

## 2024-02-06 PROCEDURE — 93793 ANTICOAG MGMT PT WARFARIN: CPT | Performed by: NURSE PRACTITIONER

## 2024-02-06 NOTE — PROGRESS NOTES
Anticoagulation Encounter     Indication for Warfarin/Diagnosis for anticoagulation -antiphospholipid syndrome and unprovoked left lower extremity DVT  INR -2.5  Goal INR- 2-3  Current dose -Coumadin 2.5 mg on Wednesday and Coumadin 5 mg p.o. nightly the rest of the week  Patient instructions -continue Coumadin 2.5 mg on Wednesday and Saturday then Coumadin 5 mg p.o. nightly the rest of the week  Next PT/INR  -3 weeks    Stephen Fomran RN discussed recommendations with patient.

## 2024-02-27 ENCOUNTER — HOSPITAL ENCOUNTER (OUTPATIENT)
Dept: INFUSION THERAPY | Age: 49
Discharge: HOME | End: 2024-02-27
Payer: OTHER GOVERNMENT

## 2024-02-27 ENCOUNTER — ANTI-COAG VISIT (OUTPATIENT)
Dept: HEMATOLOGY | Age: 49
End: 2024-02-27
Payer: OTHER GOVERNMENT

## 2024-02-27 DIAGNOSIS — D68.61 ANTIPHOSPHOLIPID SYNDROME (HCC): Primary | ICD-10-CM

## 2024-02-27 DIAGNOSIS — I82.432 ACUTE DEEP VEIN THROMBOSIS (DVT) OF POPLITEAL VEIN OF LEFT LOWER EXTREMITY (HCC): ICD-10-CM

## 2024-02-27 DIAGNOSIS — Z51.81 MONITORING FOR LONG-TERM ANTICOAGULANT USE: ICD-10-CM

## 2024-02-27 DIAGNOSIS — D68.61 ANTIPHOSPHOLIPID SYNDROME (HCC): ICD-10-CM

## 2024-02-27 DIAGNOSIS — Z79.01 MONITORING FOR LONG-TERM ANTICOAGULANT USE: ICD-10-CM

## 2024-02-27 LAB
INTERNATIONAL NORMALIZATION RATIO, POC: 2.8
PROTHROMBIN TIME, POC: NORMAL

## 2024-02-27 PROCEDURE — 85610 PROTHROMBIN TIME: CPT | Performed by: NURSE PRACTITIONER

## 2024-02-27 PROCEDURE — 93793 ANTICOAG MGMT PT WARFARIN: CPT | Performed by: NURSE PRACTITIONER

## 2024-02-27 NOTE — PROGRESS NOTES
Anticoagulation Encounter     Indication for Warfarin/Diagnosis for anticoagulation -antiphospholipid syndrome and unprovoked left lower extremity DVT  INR -2.8  Goal INR- 2-3  Current dose -Coumadin 2.5 mg on Wednesday and Saurday and  Coumadin 5 mg p.o. nightly the rest of the week  Patient instructions -continue Coumadin 2.5 mg on Wednesday and Saturday then Coumadin 5 mg p.o. nightly the rest of the week  Next PT/INR  -3 weeks    Stephen Forman RN discussed recommendations with patient.

## 2024-03-19 ENCOUNTER — ANTI-COAG VISIT (OUTPATIENT)
Dept: HEMATOLOGY | Age: 49
End: 2024-03-19
Payer: OTHER GOVERNMENT

## 2024-03-19 ENCOUNTER — HOSPITAL ENCOUNTER (OUTPATIENT)
Dept: INFUSION THERAPY | Age: 49
Discharge: HOME | End: 2024-03-19
Payer: OTHER GOVERNMENT

## 2024-03-19 DIAGNOSIS — R79.89 ELEVATED FERRITIN: ICD-10-CM

## 2024-03-19 DIAGNOSIS — Z79.01 MONITORING FOR LONG-TERM ANTICOAGULANT USE: ICD-10-CM

## 2024-03-19 DIAGNOSIS — D68.61 ANTIPHOSPHOLIPID SYNDROME (HCC): ICD-10-CM

## 2024-03-19 DIAGNOSIS — Z51.81 MONITORING FOR LONG-TERM ANTICOAGULANT USE: ICD-10-CM

## 2024-03-19 DIAGNOSIS — D68.61 ANTIPHOSPHOLIPID SYNDROME (HCC): Primary | ICD-10-CM

## 2024-03-19 DIAGNOSIS — I82.432 ACUTE DEEP VEIN THROMBOSIS (DVT) OF POPLITEAL VEIN OF LEFT LOWER EXTREMITY (HCC): ICD-10-CM

## 2024-03-19 LAB
BASOPHILS # BLD: 0.02 K/UL (ref 0.01–0.08)
BASOPHILS NFR BLD: 0.4 % (ref 0.1–1.2)
EOSINOPHIL # BLD: 0.05 K/UL (ref 0.04–0.54)
EOSINOPHIL NFR BLD: 0.9 % (ref 0.7–7)
ERYTHROCYTE [DISTWIDTH] IN BLOOD BY AUTOMATED COUNT: 13.2 % (ref 11.6–14.4)
HCT VFR BLD AUTO: 45.4 % (ref 40.1–51)
HGB BLD-MCNC: 15.5 G/DL (ref 13.7–17.5)
INTERNATIONAL NORMALIZATION RATIO, POC: 2.5
LYMPHOCYTES # BLD: 2.79 K/UL (ref 1.18–3.74)
LYMPHOCYTES NFR BLD: 51.5 % (ref 19.3–53.1)
MCH RBC QN AUTO: 27.8 PG (ref 25.7–32.2)
MCHC RBC AUTO-ENTMCNC: 34.1 G/DL (ref 32.3–36.5)
MCV RBC AUTO: 81.4 FL (ref 79–92.2)
MONOCYTES # BLD: 0.44 K/UL (ref 0.24–0.82)
MONOCYTES NFR BLD: 8.1 % (ref 4.7–12.5)
NEUTROPHILS # BLD: 2.11 K/UL (ref 1.56–6.13)
NEUTS SEG NFR BLD: 38.9 % (ref 34–71.1)
PLATELET # BLD AUTO: 203 K/UL (ref 163–337)
PMV BLD AUTO: 10.7 FL (ref 7.4–10.4)
PROTHROMBIN TIME, POC: NORMAL
RBC # BLD AUTO: 5.58 M/UL (ref 4.63–6.08)
WBC # BLD AUTO: 5.42 K/UL (ref 4.23–9.07)

## 2024-03-19 PROCEDURE — 85610 PROTHROMBIN TIME: CPT | Performed by: NURSE PRACTITIONER

## 2024-03-19 PROCEDURE — 85025 COMPLETE CBC W/AUTO DIFF WBC: CPT

## 2024-03-19 PROCEDURE — 93793 ANTICOAG MGMT PT WARFARIN: CPT | Performed by: NURSE PRACTITIONER

## 2024-03-19 PROCEDURE — 36415 COLL VENOUS BLD VENIPUNCTURE: CPT

## 2024-03-19 NOTE — PROGRESS NOTES
Anticoagulation Encounter     Indication for Warfarin/Diagnosis for anticoagulation -antiphospholipid syndrome and unprovoked left lower extremity DVT  INR -2.5  Goal INR- 2-3    Current dose -Coumadin 2.5 mg on Wednesday and Saurday and  Coumadin 5 mg p.o. nightly the rest of the week    Patient instructions -Continue Coumadin 2.5 mg on Wednesday and Saturday then Coumadin 5 mg p.o. nightly the rest of the week    Next PT/INR  -4 weeks    Stephen Forman RN discussed recommendations with patient.

## 2024-03-22 LAB
B2 GLYCOPROT1 IGG SERPL IA-ACNC: 89 SGU
B2 GLYCOPROT1 IGM SERPL IA-ACNC: <10 SMU
CARDIOLIPIN IGG SER IA-ACNC: 34 GPL
CARDIOLIPIN IGM SER IA-ACNC: <10 MPL

## 2024-03-23 LAB
APTT SCREEN TO CONFIRM RATIO: 2.11 {RATIO}
CONFIRM DRVVT STA-STACLOT: 29.4 S
DRVVT SCREEN TO CONFIRM RATIO: 1.35 {RATIO}
DRVVT SCREEN TO CONFIRM RATIO: 2.03 {RATIO}
DRVVT/DRVVT CFM P DOAC NEUT NORM PPP-RTO: ABNORMAL {RATIO}
HEPARIN NT PPP QL: ABNORMAL
LA 3 SCREEN W REFLEX-IMP: ABNORMAL
LMW HEPARIN IND PLT AB SER QL: ABNORMAL
MIXING DRVVT: 1.47 S
PROTHROMBIN TIME: 24.3 S (ref 12–15.5)
SCREEN APTT: ABNORMAL S
THROMBIN TIME: 17.8 S

## 2024-03-28 PROBLEM — R76.0 ANTIPHOSPHOLIPID ANTIBODY POSITIVE: Status: ACTIVE | Noted: 2024-03-28

## 2024-04-09 ENCOUNTER — HOSPITAL ENCOUNTER (OUTPATIENT)
Dept: INFUSION THERAPY | Age: 49
Discharge: HOME | End: 2024-04-09
Payer: OTHER GOVERNMENT

## 2024-04-09 ENCOUNTER — ANTI-COAG VISIT (OUTPATIENT)
Dept: HEMATOLOGY | Age: 49
End: 2024-04-09
Payer: OTHER GOVERNMENT

## 2024-04-09 DIAGNOSIS — Z51.81 MONITORING FOR LONG-TERM ANTICOAGULANT USE: ICD-10-CM

## 2024-04-09 DIAGNOSIS — I82.432 ACUTE DEEP VEIN THROMBOSIS (DVT) OF POPLITEAL VEIN OF LEFT LOWER EXTREMITY (HCC): ICD-10-CM

## 2024-04-09 DIAGNOSIS — Z79.01 MONITORING FOR LONG-TERM ANTICOAGULANT USE: ICD-10-CM

## 2024-04-09 DIAGNOSIS — D68.61 ANTIPHOSPHOLIPID SYNDROME (HCC): ICD-10-CM

## 2024-04-09 DIAGNOSIS — D68.61 ANTIPHOSPHOLIPID SYNDROME (HCC): Primary | ICD-10-CM

## 2024-04-09 LAB
INTERNATIONAL NORMALIZATION RATIO, POC: 1.3
PROTHROMBIN TIME, POC: NORMAL

## 2024-04-09 PROCEDURE — 85610 PROTHROMBIN TIME: CPT | Performed by: NURSE PRACTITIONER

## 2024-04-09 PROCEDURE — 93793 ANTICOAG MGMT PT WARFARIN: CPT | Performed by: NURSE PRACTITIONER

## 2024-04-09 NOTE — PROGRESS NOTES
Anticoagulation Encounter     Indication for Warfarin/Diagnosis for anticoagulation -antiphospholipid syndrome and unprovoked left lower extremity DVT  INR -1.3  Goal INR- 2-3    Current dose -Coumadin had been on hold and on Lovenox for dental procedure.  Currently bridging back with Coumadin 5 mg p.o. daily and Lovenox every 12 hours      Patient instructions -Coumadin 10 mg tonight and 5 mg on 4/10/2024 then resume continue Coumadin 2.5 mg on Wednesday and Saturday then Coumadin 5 mg p.o. nightly the rest of the week.  Has 5 more Lovenox injections, instructed to continue until they are completed    Next PT/INR  - 4/16/2024    Stephen Forman RN discussed recommendations with patient.

## 2024-04-16 ENCOUNTER — ANTI-COAG VISIT (OUTPATIENT)
Dept: HEMATOLOGY | Age: 49
End: 2024-04-16
Payer: OTHER GOVERNMENT

## 2024-04-16 ENCOUNTER — HOSPITAL ENCOUNTER (OUTPATIENT)
Dept: INFUSION THERAPY | Age: 49
Discharge: HOME | End: 2024-04-16
Payer: OTHER GOVERNMENT

## 2024-04-16 DIAGNOSIS — D68.61 ANTIPHOSPHOLIPID SYNDROME (HCC): Primary | ICD-10-CM

## 2024-04-16 DIAGNOSIS — D68.61 ANTIPHOSPHOLIPID SYNDROME (HCC): ICD-10-CM

## 2024-04-16 DIAGNOSIS — Z79.01 MONITORING FOR LONG-TERM ANTICOAGULANT USE: ICD-10-CM

## 2024-04-16 DIAGNOSIS — R76.0 ANTIPHOSPHOLIPID ANTIBODY POSITIVE: ICD-10-CM

## 2024-04-16 DIAGNOSIS — I82.432 ACUTE DEEP VEIN THROMBOSIS (DVT) OF POPLITEAL VEIN OF LEFT LOWER EXTREMITY (HCC): ICD-10-CM

## 2024-04-16 DIAGNOSIS — Z51.81 MONITORING FOR LONG-TERM ANTICOAGULANT USE: ICD-10-CM

## 2024-04-16 LAB
INTERNATIONAL NORMALIZATION RATIO, POC: 2.3
PROTHROMBIN TIME, POC: NORMAL

## 2024-04-16 PROCEDURE — 93793 ANTICOAG MGMT PT WARFARIN: CPT | Performed by: NURSE PRACTITIONER

## 2024-04-16 PROCEDURE — 85610 PROTHROMBIN TIME: CPT | Performed by: NURSE PRACTITIONER

## 2024-04-16 NOTE — PROGRESS NOTES
Anticoagulation Encounter     Indication for Warfarin/Diagnosis for anticoagulation -antiphospholipid syndrome and unprovoked left lower extremity DVT  INR -2.3  Goal INR- 2-3    Current dose -Coumadin 2.5 mg on Wednesday and Saturday then Coumadin 5 mg p.o. nightly the rest of the week      Patient instructions -Continue Coumadin 2.5 mg on Wednesday and Saturday then Coumadin 5 mg p.o. nightly the rest of the week.      Next PT/INR  -3 weeks    Stephen Forman RN discussed recommendations with patient.

## 2024-05-02 ENCOUNTER — ANTI-COAG VISIT (OUTPATIENT)
Dept: HEMATOLOGY | Age: 49
End: 2024-05-02
Payer: OTHER GOVERNMENT

## 2024-05-02 DIAGNOSIS — D68.61 ANTIPHOSPHOLIPID SYNDROME (HCC): Primary | ICD-10-CM

## 2024-05-02 DIAGNOSIS — I82.432 ACUTE DEEP VEIN THROMBOSIS (DVT) OF POPLITEAL VEIN OF LEFT LOWER EXTREMITY (HCC): ICD-10-CM

## 2024-05-02 PROCEDURE — 93793 ANTICOAG MGMT PT WARFARIN: CPT | Performed by: NURSE PRACTITIONER

## 2024-05-02 NOTE — PROGRESS NOTES
Anticoagulation Encounter     Indication for Warfarin/Diagnosis for anticoagulation -antiphospholipid syndrome and unprovoked left lower extremity DVT  INR 2.6  Goal INR- 2-3    Current dose -Coumadin 2.5 mg on Wednesday and Saturday then Coumadin 5 mg p.o. nightly the rest of the week      Patient instructions -Continue Coumadin 2.5 mg on Wednesday and Saturday then Coumadin 5 mg p.o. nightly the rest of the week.      Next PT/INR  -1 week    Stephen Forman RN discussed recommendations with patient.

## 2024-05-07 ENCOUNTER — ANTI-COAG VISIT (OUTPATIENT)
Dept: HEMATOLOGY | Age: 49
End: 2024-05-07

## 2024-05-07 DIAGNOSIS — D68.61 ANTIPHOSPHOLIPID SYNDROME (HCC): Primary | ICD-10-CM

## 2024-05-07 DIAGNOSIS — I82.432 ACUTE DEEP VEIN THROMBOSIS (DVT) OF POPLITEAL VEIN OF LEFT LOWER EXTREMITY (HCC): ICD-10-CM

## 2024-05-07 NOTE — PROGRESS NOTES
Anticoagulation Encounter     Indication for Warfarin/Diagnosis for anticoagulation -antiphospholipid syndrome and unprovoked left lower extremity DVT  INR 2.9  Goal INR- 2-3    Current dose -Coumadin 2.5 mg on Wednesday and Saturday then Coumadin 5 mg p.o. nightly the rest of the week    Patient instructions -no change and continue Coumadin 2.5 mg on Wednesday and Saturday then Coumadin 5 mg p.o. nightly the rest of the week.      Next PT/INR  -1 week    Stephen Forman RN discussed recommendations with patient.

## 2024-05-14 ENCOUNTER — ANTI-COAG VISIT (OUTPATIENT)
Dept: HEMATOLOGY | Age: 49
End: 2024-05-14
Payer: OTHER GOVERNMENT

## 2024-05-14 DIAGNOSIS — D68.61 ANTIPHOSPHOLIPID SYNDROME (HCC): Primary | ICD-10-CM

## 2024-05-14 DIAGNOSIS — R76.0 ANTIPHOSPHOLIPID ANTIBODY POSITIVE: ICD-10-CM

## 2024-05-14 DIAGNOSIS — I82.432 ACUTE DEEP VEIN THROMBOSIS (DVT) OF POPLITEAL VEIN OF LEFT LOWER EXTREMITY (HCC): ICD-10-CM

## 2024-05-14 PROCEDURE — 93793 ANTICOAG MGMT PT WARFARIN: CPT | Performed by: NURSE PRACTITIONER

## 2024-05-14 NOTE — PROGRESS NOTES
Anticoagulation Encounter     Indication for Warfarin/Diagnosis for anticoagulation -antiphospholipid syndrome and unprovoked left lower extremity DVT  INR 2.1  Goal INR- 2-3    Current dose -Coumadin 2.5 mg on Wednesday and Saturday then Coumadin 5 mg p.o. nightly the rest of the week    Patient instructions -INR remains therapeutic, continue with current regimen: Coumadin 2.5 mg on Wednesday and Saturday then Coumadin 5 mg p.o. nightly the rest of the week.      Next PT/INR  -1 week    Stephen Forman RN discussed recommendations with patient.

## 2024-05-21 ENCOUNTER — ANTI-COAG VISIT (OUTPATIENT)
Dept: HEMATOLOGY | Age: 49
End: 2024-05-21
Payer: OTHER GOVERNMENT

## 2024-05-21 DIAGNOSIS — D68.61 ANTIPHOSPHOLIPID SYNDROME (HCC): Primary | ICD-10-CM

## 2024-05-21 DIAGNOSIS — I82.432 ACUTE DEEP VEIN THROMBOSIS (DVT) OF POPLITEAL VEIN OF LEFT LOWER EXTREMITY (HCC): ICD-10-CM

## 2024-05-21 PROCEDURE — 93793 ANTICOAG MGMT PT WARFARIN: CPT | Performed by: NURSE PRACTITIONER

## 2024-05-21 NOTE — PROGRESS NOTES
Anticoagulation Encounter     Indication for Warfarin/Diagnosis for anticoagulation -antiphospholipid syndrome and unprovoked left lower extremity DVT  INR 2.5  Goal INR- 2-3    Current dose -Coumadin 2.5 mg on Wednesday and Saturday then Coumadin 5 mg p.o. nightly the rest of the week    Patient instructions -INR remains therapeutic, continue with current regimen: Coumadin 2.5 mg on Wednesday and Saturday then Coumadin 5 mg p.o. nightly the rest of the week.      Next PT/INR  -1 week with home monitoring system    Stephen Forman RN discussed recommendations with patient.

## 2024-05-30 ENCOUNTER — ANTI-COAG VISIT (OUTPATIENT)
Dept: HEMATOLOGY | Age: 49
End: 2024-05-30
Payer: OTHER GOVERNMENT

## 2024-05-30 DIAGNOSIS — I82.432 ACUTE DEEP VEIN THROMBOSIS (DVT) OF POPLITEAL VEIN OF LEFT LOWER EXTREMITY (HCC): ICD-10-CM

## 2024-05-30 DIAGNOSIS — D68.61 ANTIPHOSPHOLIPID SYNDROME (HCC): Primary | ICD-10-CM

## 2024-05-30 DIAGNOSIS — D68.61 ANTIPHOSPHOLIPID SYNDROME (HCC): ICD-10-CM

## 2024-05-30 PROCEDURE — 93793 ANTICOAG MGMT PT WARFARIN: CPT | Performed by: NURSE PRACTITIONER

## 2024-05-30 RX ORDER — WARFARIN SODIUM 5 MG/1
5 TABLET ORAL DAILY
Qty: 60 TABLET | Refills: 3 | Status: SHIPPED | OUTPATIENT
Start: 2024-05-30

## 2024-05-30 NOTE — PROGRESS NOTES
Anticoagulation Encounter     Indication for Warfarin/Diagnosis for anticoagulation -antiphospholipid syndrome and unprovoked left lower extremity DVT  INR 2.7  Goal INR- 2-3    Current dose -Coumadin 2.5 mg on Wednesday and Saturday then Coumadin 5 mg p.o. nightly the rest of the week    Patient instructions -INR remains therapeutic, continue with current regimen: Coumadin 2.5 mg on Wednesday and Saturday then Coumadin 5 mg p.o. nightly the rest of the week.      Next PT/INR  -1 week with home monitoring system    Stephen Forman RN discussed recommendations with patient.

## 2024-06-04 ENCOUNTER — ANTI-COAG VISIT (OUTPATIENT)
Dept: HEMATOLOGY | Age: 49
End: 2024-06-04
Payer: OTHER GOVERNMENT

## 2024-06-04 DIAGNOSIS — I82.432 ACUTE DEEP VEIN THROMBOSIS (DVT) OF POPLITEAL VEIN OF LEFT LOWER EXTREMITY (HCC): ICD-10-CM

## 2024-06-04 DIAGNOSIS — D68.61 ANTIPHOSPHOLIPID SYNDROME (HCC): Primary | ICD-10-CM

## 2024-06-04 PROCEDURE — 93793 ANTICOAG MGMT PT WARFARIN: CPT | Performed by: NURSE PRACTITIONER

## 2024-06-04 NOTE — PROGRESS NOTES
Anticoagulation Encounter     Indication for Warfarin/Diagnosis for anticoagulation -antiphospholipid syndrome and unprovoked left lower extremity DVT  INR 2.6  Goal INR- 2-3    Current dose -Coumadin 2.5 mg on Wednesday and Saturday then Coumadin 5 mg p.o. nightly the rest of the week    Patient instructions -INR remains within therapeutic range, continue current regimen: Coumadin 2.5 mg on Wednesday and Saturday then Coumadin 5 mg p.o. nightly the rest of the week.      Next PT/INR  -1 week with home monitoring system    Stephen Forman RN discussed recommendations with patient.

## 2024-06-11 ENCOUNTER — ANTI-COAG VISIT (OUTPATIENT)
Dept: HEMATOLOGY | Age: 49
End: 2024-06-11
Payer: OTHER GOVERNMENT

## 2024-06-11 DIAGNOSIS — D68.61 ANTIPHOSPHOLIPID SYNDROME (HCC): Primary | ICD-10-CM

## 2024-06-11 DIAGNOSIS — I82.432 ACUTE DEEP VEIN THROMBOSIS (DVT) OF POPLITEAL VEIN OF LEFT LOWER EXTREMITY (HCC): ICD-10-CM

## 2024-06-11 DIAGNOSIS — R76.0 ANTIPHOSPHOLIPID ANTIBODY POSITIVE: ICD-10-CM

## 2024-06-11 PROCEDURE — 93793 ANTICOAG MGMT PT WARFARIN: CPT | Performed by: NURSE PRACTITIONER

## 2024-06-11 NOTE — PROGRESS NOTES
Anticoagulation Encounter     Indication for Warfarin/Diagnosis for anticoagulation -antiphospholipid syndrome and unprovoked left lower extremity DVT  INR 2.5  Goal INR- 2-3    Current dose -Coumadin 2.5 mg on Wednesday and Saturday then Coumadin 5 mg p.o. nightly the rest of the week    Patient instructions -INR remains within therapeutic range, continue with current regimen: Coumadin 2.5 mg on Wednesday and Saturday then Coumadin 5 mg p.o. nightly the rest of the week.      Next PT/INR  -1 week with home monitoring system    Stephen Forman RN discussed recommendations with patient.

## 2024-06-19 ENCOUNTER — ANTI-COAG VISIT (OUTPATIENT)
Dept: HEMATOLOGY | Age: 49
End: 2024-06-19
Payer: OTHER GOVERNMENT

## 2024-06-19 DIAGNOSIS — I82.432 ACUTE DEEP VEIN THROMBOSIS (DVT) OF POPLITEAL VEIN OF LEFT LOWER EXTREMITY (HCC): ICD-10-CM

## 2024-06-19 DIAGNOSIS — D68.61 ANTIPHOSPHOLIPID SYNDROME (HCC): Primary | ICD-10-CM

## 2024-06-19 PROCEDURE — 93793 ANTICOAG MGMT PT WARFARIN: CPT | Performed by: NURSE PRACTITIONER

## 2024-06-19 NOTE — PROGRESS NOTES
Anticoagulation Encounter     Indication for Warfarin/Diagnosis for anticoagulation -antiphospholipid syndrome and unprovoked left lower extremity DVT  INR 1.6  Goal INR- 2-3    Current dose -Coumadin 2.5 mg on Wednesday and Saturday then Coumadin 5 mg p.o. nightly the rest of the week    Patient instructions Coumadin 10 mg p.o. nightly x 2 then resume Coumadin 2.5 mg on Wednesday and Saturday then Coumadin 5 mg p.o. nightly the rest of the week.      Next PT/INR  -1 week with home monitoring system    Stephen Forman RN discussed recommendations with patient.

## 2024-06-27 ENCOUNTER — ANTI-COAG VISIT (OUTPATIENT)
Dept: HEMATOLOGY | Age: 49
End: 2024-06-27
Payer: OTHER GOVERNMENT

## 2024-06-27 DIAGNOSIS — I82.432 ACUTE DEEP VEIN THROMBOSIS (DVT) OF POPLITEAL VEIN OF LEFT LOWER EXTREMITY (HCC): ICD-10-CM

## 2024-06-27 DIAGNOSIS — D68.61 ANTIPHOSPHOLIPID SYNDROME (HCC): Primary | ICD-10-CM

## 2024-06-27 PROCEDURE — 93793 ANTICOAG MGMT PT WARFARIN: CPT | Performed by: NURSE PRACTITIONER

## 2024-06-27 NOTE — PROGRESS NOTES
Anticoagulation Encounter     Indication for Warfarin/Diagnosis for anticoagulation -antiphospholipid syndrome and unprovoked left lower extremity DVT  INR 3.7  Goal INR- 2-3    Current dose -Coumadin 2.5 mg on Wednesday and Saturday then Coumadin 5 mg p.o. nightly the rest of the week    Patient instructions hold Coumadin x 1 then resume Coumadin 2.5 mg on Wednesday and Saturday then Coumadin 5 mg p.o. nightly the rest of the week.      Next PT/INR  - 10 days with home monitoring system    Stephen Forman RN discussed recommendations with patient.

## 2024-07-18 ENCOUNTER — TELEPHONE (OUTPATIENT)
Dept: HEMATOLOGY | Age: 49
End: 2024-07-18

## 2024-07-18 NOTE — TELEPHONE ENCOUNTER
I called and reminded pt. of their appt. on 07/23/2024. Made pt. aware to arrive at appt. time & not lab appt. time. I also advised pt. to arrive no sooner than appt time. Pt voiced understanding and confirmed appt. date and time.

## 2024-07-22 DIAGNOSIS — D68.61 ANTIPHOSPHOLIPID SYNDROME (HCC): ICD-10-CM

## 2024-07-22 DIAGNOSIS — R79.89 ELEVATED FERRITIN: Primary | ICD-10-CM

## 2024-07-22 NOTE — PROGRESS NOTES
Progress note      Pt Name: Micky Pavon  YOB: 1975  MRN: [id number]    Date of evaluation: 7/23/2024  History Obtained From:  patient, electronic medical record    CHIEF COMPLAINT:    Chief Complaint   Patient presents with    Follow-up     Elevated ferritin  Acute deep vein thrombosis (DVT) of popliteal vein of left lower extremity (HCC)       HISTORY OF PRESENT ILLNESS:    Micky Pavon is a 49 y.o.  male who is currently being followed for elevated ferritin suspected to be reactive process, subcentimeter right upper lobe lung nodule and antiphospholipid syndrome, identified after acute DVT in the left lower extremity.  Serology on 3/19/2024 confirmed lupus anticoagulant positive.  He is currently anticoagulated with Coumadin 2.5 mg p.o. on Wednesday and Saturday then Coumadin 5 mg daily the rest of the week.  Micky returns today in scheduled follow-up for evaluation, lab monitoring, and further treatment recommendations.  He has an INR of 3.1 today.    Today's clinic visit to include physical assessment, review of systems, any lab or radiographic findings that were available and plan of care are documented below.    HEMATOLOGIC HISTORY:   Diagnosis:  Deep vein thrombus of the left lower extremity within the distal superficial femoral, popliteal, and posterior tibial veins on 12/8/2023  Antiphospholipid syndrome/lupus anticoagulant positive, 12/15/2023 and 3/19/2024.  Elevated ferritin with elevated liver enzymes (negative for hemochromatosis and hepatitis), suspect reactive phase  Subcentimeter right upper lobe lung nodule    Treatment summary:  Initiated Xarelto on 12/8/2023: Xarelto 15 mg p.o. twice daily for 14 days then Xarelto 20 mg daily.   12/26/2023-discontinued Xarelto and transitioned to Coumadin  Avoid iron enriched foods and iron supplement    Micky was last seen in initial hematology consultation on

## 2024-07-23 ENCOUNTER — OFFICE VISIT (OUTPATIENT)
Dept: HEMATOLOGY | Age: 49
End: 2024-07-23
Payer: OTHER GOVERNMENT

## 2024-07-23 ENCOUNTER — HOSPITAL ENCOUNTER (OUTPATIENT)
Dept: INFUSION THERAPY | Age: 49
Discharge: HOME | End: 2024-07-23
Payer: OTHER GOVERNMENT

## 2024-07-23 VITALS
OXYGEN SATURATION: 99 % | DIASTOLIC BLOOD PRESSURE: 78 MMHG | HEART RATE: 75 BPM | BODY MASS INDEX: 32.66 KG/M2 | WEIGHT: 241.1 LBS | TEMPERATURE: 98.1 F | SYSTOLIC BLOOD PRESSURE: 118 MMHG | HEIGHT: 72 IN

## 2024-07-23 DIAGNOSIS — D68.61 ANTIPHOSPHOLIPID SYNDROME (HCC): Primary | ICD-10-CM

## 2024-07-23 DIAGNOSIS — Z51.81 MONITORING FOR LONG-TERM ANTICOAGULANT USE: ICD-10-CM

## 2024-07-23 DIAGNOSIS — R79.89 ELEVATED FERRITIN: ICD-10-CM

## 2024-07-23 DIAGNOSIS — Z86.718 HISTORY OF DEEP VENOUS THROMBOSIS (DVT) OF DISTAL VEIN OF LEFT LOWER EXTREMITY: ICD-10-CM

## 2024-07-23 DIAGNOSIS — D68.61 ANTIPHOSPHOLIPID SYNDROME (HCC): ICD-10-CM

## 2024-07-23 DIAGNOSIS — Z79.01 MONITORING FOR LONG-TERM ANTICOAGULANT USE: ICD-10-CM

## 2024-07-23 DIAGNOSIS — R91.1 NODULE OF UPPER LOBE OF RIGHT LUNG: ICD-10-CM

## 2024-07-23 LAB
ALBUMIN SERPL-MCNC: 4.5 G/DL (ref 3.5–5.2)
ALP SERPL-CCNC: 86 U/L (ref 40–130)
ALT SERPL-CCNC: 46 U/L (ref 21–72)
ANION GAP SERPL CALCULATED.3IONS-SCNC: 10 MMOL/L (ref 7–19)
AST SERPL-CCNC: 34 U/L (ref 17–59)
BASOPHILS # BLD: 0.03 K/UL (ref 0.01–0.08)
BASOPHILS NFR BLD: 0.5 % (ref 0.1–1.2)
BILIRUB SERPL-MCNC: 1.1 MG/DL (ref 0.2–1.3)
BUN SERPL-MCNC: 17 MG/DL (ref 9–20)
CALCIUM SERPL-MCNC: 8.8 MG/DL (ref 8.4–10.2)
CHLORIDE SERPL-SCNC: 102 MMOL/L (ref 98–111)
CO2 SERPL-SCNC: 29 MMOL/L (ref 22–29)
CREAT SERPL-MCNC: 0.9 MG/DL (ref 0.6–1.2)
EOSINOPHIL # BLD: 0.06 K/UL (ref 0.04–0.54)
EOSINOPHIL NFR BLD: 1.1 % (ref 0.7–7)
ERYTHROCYTE [DISTWIDTH] IN BLOOD BY AUTOMATED COUNT: 12.9 % (ref 11.6–14.4)
FERRITIN SERPL-MCNC: 523.1 NG/ML (ref 30–400)
GLOBULIN: 3.5 G/DL
GLUCOSE SERPL-MCNC: 117 MG/DL (ref 74–106)
HCT VFR BLD AUTO: 45.2 % (ref 40.1–51)
HGB BLD-MCNC: 15.2 G/DL (ref 13.7–17.5)
INTERNATIONAL NORMALIZATION RATIO, POC: 3.1
LYMPHOCYTES # BLD: 2.83 K/UL (ref 1.18–3.74)
LYMPHOCYTES NFR BLD: 50.9 % (ref 19.3–53.1)
MCH RBC QN AUTO: 27.2 PG (ref 25.7–32.2)
MCHC RBC AUTO-ENTMCNC: 33.6 G/DL (ref 32.3–36.5)
MCV RBC AUTO: 80.9 FL (ref 79–92.2)
MONOCYTES # BLD: 0.59 K/UL (ref 0.24–0.82)
MONOCYTES NFR BLD: 10.6 % (ref 4.7–12.5)
NEUTROPHILS # BLD: 2.04 K/UL (ref 1.56–6.13)
NEUTS SEG NFR BLD: 36.7 % (ref 34–71.1)
PLATELET # BLD AUTO: 184 K/UL (ref 163–337)
PMV BLD AUTO: 11.1 FL (ref 7.4–10.4)
POTASSIUM SERPL-SCNC: 4.3 MMOL/L (ref 3.5–5.1)
PROT SERPL-MCNC: 8 G/DL (ref 6.3–8.2)
PROTHROMBIN TIME, POC: 0
RBC # BLD AUTO: 5.59 M/UL (ref 4.63–6.08)
SODIUM SERPL-SCNC: 141 MMOL/L (ref 137–145)
WBC # BLD AUTO: 5.56 K/UL (ref 4.23–9.07)

## 2024-07-23 PROCEDURE — 80053 COMPREHEN METABOLIC PANEL: CPT

## 2024-07-23 PROCEDURE — 85610 PROTHROMBIN TIME: CPT | Performed by: NURSE PRACTITIONER

## 2024-07-23 PROCEDURE — 99212 OFFICE O/P EST SF 10 MIN: CPT

## 2024-07-23 PROCEDURE — 85025 COMPLETE CBC W/AUTO DIFF WBC: CPT

## 2024-07-23 PROCEDURE — 99214 OFFICE O/P EST MOD 30 MIN: CPT | Performed by: NURSE PRACTITIONER

## 2024-07-23 PROCEDURE — 36415 COLL VENOUS BLD VENIPUNCTURE: CPT

## 2024-07-24 ASSESSMENT — ENCOUNTER SYMPTOMS
SHORTNESS OF BREATH: 0
ABDOMINAL PAIN: 0
SORE THROAT: 0
EYES NEGATIVE: 1
EYE REDNESS: 0
VOMITING: 0
CONSTIPATION: 0
BACK PAIN: 0
BLOOD IN STOOL: 0
EYE PAIN: 0
GASTROINTESTINAL NEGATIVE: 1
EYE DISCHARGE: 0
RESPIRATORY NEGATIVE: 1
WHEEZING: 0
DIARRHEA: 0
NAUSEA: 0
COUGH: 0

## 2024-07-30 ENCOUNTER — TELEPHONE (OUTPATIENT)
Dept: HEMATOLOGY | Age: 49
End: 2024-07-30

## 2024-07-30 ENCOUNTER — ANTI-COAG VISIT (OUTPATIENT)
Dept: HEMATOLOGY | Age: 49
End: 2024-07-30
Payer: OTHER GOVERNMENT

## 2024-07-30 DIAGNOSIS — D68.61 ANTIPHOSPHOLIPID SYNDROME (HCC): Primary | ICD-10-CM

## 2024-07-30 DIAGNOSIS — Z86.718 HISTORY OF DEEP VENOUS THROMBOSIS (DVT) OF DISTAL VEIN OF LEFT LOWER EXTREMITY: ICD-10-CM

## 2024-07-30 PROCEDURE — 93793 ANTICOAG MGMT PT WARFARIN: CPT | Performed by: NURSE PRACTITIONER

## 2024-07-30 NOTE — TELEPHONE ENCOUNTER
Called patient and reviewed lab results, ferritin 523.  Instructed that ASHLEY Sylvester would like for him to have this repeated in about 8 weeks.  [facility] and [facility] will [facility].  Instructed to have them ax a copy to our office and to let me know if they are going to recheck it.  If not will set up appointment here to have it rechecked.  Patient v/u and is agreeable to plan.

## 2024-07-30 NOTE — TELEPHONE ENCOUNTER
----- Message from ASHLEY Layne sent at 7/24/2024  8:49 AM CDT -----  Ferritin is elevated, request that he come by in 8 weeks to have a repeat ferritin level

## 2024-07-30 NOTE — PROGRESS NOTES
Anticoagulation Encounter     Indication for Warfarin/Diagnosis for anticoagulation -antiphospholipid syndrome and unprovoked left lower extremity DVT  INR 2.9  Goal INR- 2-3    Current dose -Coumadin 2.5 mg on Wednesday and Saturday then Coumadin 5 mg p.o. nightly the rest of the week    Patient instructions: Continue Coumadin 2.5 mg on Wednesday and Saturday then Coumadin 5 mg p.o. nightly the rest of the week.      Next PT/INR  - 1 week with home monitoring system    Stephen Forman RN discussed recommendations with patient.

## 2024-08-09 ENCOUNTER — ANTI-COAG VISIT (OUTPATIENT)
Dept: HEMATOLOGY | Age: 49
End: 2024-08-09

## 2024-08-09 DIAGNOSIS — Z86.718 HISTORY OF DEEP VENOUS THROMBOSIS (DVT) OF DISTAL VEIN OF LEFT LOWER EXTREMITY: ICD-10-CM

## 2024-08-09 DIAGNOSIS — D68.61 ANTIPHOSPHOLIPID SYNDROME (HCC): Primary | ICD-10-CM

## 2024-08-09 NOTE — PROGRESS NOTES
Anticoagulation Encounter     Indication for Warfarin/Diagnosis for anticoagulation -antiphospholipid syndrome and unprovoked left lower extremity DVT  INR 2.6  Goal INR- 2-3    Current dose -Coumadin 2.5 mg on Wednesday and Saturday then Coumadin 5 mg p.o. nightly the rest of the week    Patient instructions: Continue Coumadin 2.5 mg on Wednesday and Saturday then Coumadin 5 mg p.o. nightly the rest of the week.      Next PT/INR  - 1 week with home monitoring system    Stephen Forman RN discussed recommendations with patient.

## 2024-08-13 ENCOUNTER — ANTI-COAG VISIT (OUTPATIENT)
Dept: HEMATOLOGY | Age: 49
End: 2024-08-13
Payer: OTHER GOVERNMENT

## 2024-08-13 DIAGNOSIS — D68.61 ANTIPHOSPHOLIPID SYNDROME (HCC): Primary | ICD-10-CM

## 2024-08-13 DIAGNOSIS — Z86.718 HISTORY OF DEEP VENOUS THROMBOSIS (DVT) OF DISTAL VEIN OF LEFT LOWER EXTREMITY: ICD-10-CM

## 2024-08-13 PROCEDURE — 93793 ANTICOAG MGMT PT WARFARIN: CPT | Performed by: NURSE PRACTITIONER

## 2024-08-13 NOTE — PROGRESS NOTES
Anticoagulation Encounter     Indication for Warfarin/Diagnosis for anticoagulation -antiphospholipid syndrome and unprovoked left lower extremity DVT  INR 3.0  Goal INR- 2-3    Current dose -Coumadin 2.5 mg on  Wednesday and Saturday then Coumadin 5 mg p.o. nightly the rest of the week    Patient instructions: Change Coumadin 2.5 mg on Monday, Wednesday and Saturday then Coumadin 5 mg p.o. nightly the rest of the week.      Next PT/INR  - 1 week with home monitoring system    Stephen Forman RN discussed recommendations with patient.

## 2024-08-20 ENCOUNTER — ANTI-COAG VISIT (OUTPATIENT)
Dept: HEMATOLOGY | Age: 49
End: 2024-08-20
Payer: OTHER GOVERNMENT

## 2024-08-20 DIAGNOSIS — D68.61 ANTIPHOSPHOLIPID SYNDROME (HCC): Primary | ICD-10-CM

## 2024-08-20 DIAGNOSIS — Z86.718 HISTORY OF DEEP VENOUS THROMBOSIS (DVT) OF DISTAL VEIN OF LEFT LOWER EXTREMITY: ICD-10-CM

## 2024-08-20 DIAGNOSIS — Z79.01 ENCOUNTER FOR MONITORING COUMADIN THERAPY: ICD-10-CM

## 2024-08-20 DIAGNOSIS — Z51.81 ENCOUNTER FOR MONITORING COUMADIN THERAPY: ICD-10-CM

## 2024-08-20 PROCEDURE — 93793 ANTICOAG MGMT PT WARFARIN: CPT | Performed by: NURSE PRACTITIONER

## 2024-08-20 NOTE — PROGRESS NOTES
Anticoagulation Encounter     Indication for Warfarin/Diagnosis for anticoagulation -antiphospholipid syndrome and unprovoked left lower extremity DVT  INR 2.7  Goal INR- 2-3    Current dose -Coumadin 2.5 mg on  Wednesday and Saturday then Coumadin 5 mg p.o. nightly the rest of the week    Patient instructions: Change Coumadin 2.5 mg on Monday, Wednesday and Saturday then Coumadin 5 mg p.o. nightly the rest of the week.      No change in Plan of care    Next PT/INR  - 1 week with home monitoring system    Stephen Forman RN discussed recommendations with patient.

## 2024-08-29 ENCOUNTER — ANTI-COAG VISIT (OUTPATIENT)
Dept: HEMATOLOGY | Age: 49
End: 2024-08-29
Payer: OTHER GOVERNMENT

## 2024-08-29 DIAGNOSIS — Z86.718 HISTORY OF DEEP VENOUS THROMBOSIS (DVT) OF DISTAL VEIN OF LEFT LOWER EXTREMITY: ICD-10-CM

## 2024-08-29 DIAGNOSIS — D68.61 ANTIPHOSPHOLIPID SYNDROME (HCC): Primary | ICD-10-CM

## 2024-08-29 PROCEDURE — 93793 ANTICOAG MGMT PT WARFARIN: CPT | Performed by: NURSE PRACTITIONER

## 2024-08-29 NOTE — PROGRESS NOTES
Anticoagulation Encounter     Indication for Warfarin/Diagnosis for anticoagulation -antiphospholipid syndrome and unprovoked left lower extremity DVT  INR 2.0  Goal INR- 2-3    Current dose -Coumadin 2.5 mg on Monday, Wednesday and Saturday then Coumadin 5 mg p.o. nightly the rest of the week    Patient instructions: Continue Coumadin 2.5 mg on Monday, Wednesday and Saturday then Coumadin 5 mg p.o. nightly the rest of the week.      No change in Plan of care    Next PT/INR  - 1 week with home monitoring system    Stephen Forman RN discussed recommendations with patient.

## 2024-09-06 ENCOUNTER — ANTI-COAG VISIT (OUTPATIENT)
Dept: HEMATOLOGY | Age: 49
End: 2024-09-06

## 2024-09-06 DIAGNOSIS — Z51.81 ENCOUNTER FOR MONITORING COUMADIN THERAPY: ICD-10-CM

## 2024-09-06 DIAGNOSIS — Z79.01 ENCOUNTER FOR MONITORING COUMADIN THERAPY: ICD-10-CM

## 2024-09-06 DIAGNOSIS — Z86.718 HISTORY OF DEEP VENOUS THROMBOSIS (DVT) OF DISTAL VEIN OF LEFT LOWER EXTREMITY: ICD-10-CM

## 2024-09-06 DIAGNOSIS — D68.61 ANTIPHOSPHOLIPID SYNDROME (HCC): Primary | ICD-10-CM

## 2024-09-06 NOTE — PROGRESS NOTES
Anticoagulation Encounter     Indication for Warfarin/Diagnosis for anticoagulation -antiphospholipid syndrome and unprovoked left lower extremity DVT  INR 2.3  Goal INR- 2-3    Current dose -Coumadin 2.5 mg on Monday, Wednesday and Saturday then Coumadin 5 mg p.o. nightly the rest of the week    Patient instructions: Continue Coumadin 2.5 mg on Monday, Wednesday and Saturday then Coumadin 5 mg p.o. nightly the rest of the week.      No change in Plan of care    Next PT/INR  - 1 week with home monitoring system    Stephen Forman RN discussed recommendations with patient.

## 2024-09-13 ENCOUNTER — ANTI-COAG VISIT (OUTPATIENT)
Dept: HEMATOLOGY | Age: 49
End: 2024-09-13

## 2024-09-13 DIAGNOSIS — Z79.01 ANTICOAGULATION MANAGEMENT ENCOUNTER: ICD-10-CM

## 2024-09-13 DIAGNOSIS — R76.0 ANTIPHOSPHOLIPID ANTIBODY POSITIVE: Primary | ICD-10-CM

## 2024-09-13 DIAGNOSIS — Z51.81 ANTICOAGULATION MANAGEMENT ENCOUNTER: ICD-10-CM

## 2024-09-24 ENCOUNTER — ANTI-COAG VISIT (OUTPATIENT)
Dept: HEMATOLOGY | Age: 49
End: 2024-09-24
Payer: OTHER GOVERNMENT

## 2024-09-24 DIAGNOSIS — Z86.718 HISTORY OF DEEP VENOUS THROMBOSIS (DVT) OF DISTAL VEIN OF LEFT LOWER EXTREMITY: ICD-10-CM

## 2024-09-24 DIAGNOSIS — R76.0 ANTIPHOSPHOLIPID ANTIBODY POSITIVE: Primary | ICD-10-CM

## 2024-09-24 DIAGNOSIS — Z79.01 ANTICOAGULATION MANAGEMENT ENCOUNTER: ICD-10-CM

## 2024-09-24 DIAGNOSIS — Z51.81 ANTICOAGULATION MANAGEMENT ENCOUNTER: ICD-10-CM

## 2024-09-24 PROCEDURE — 93793 ANTICOAG MGMT PT WARFARIN: CPT | Performed by: NURSE PRACTITIONER

## 2024-10-02 ENCOUNTER — ANTI-COAG VISIT (OUTPATIENT)
Dept: HEMATOLOGY | Age: 49
End: 2024-10-02
Payer: OTHER GOVERNMENT

## 2024-10-02 DIAGNOSIS — R76.0 ANTIPHOSPHOLIPID ANTIBODY POSITIVE: Primary | ICD-10-CM

## 2024-10-02 DIAGNOSIS — Z51.81 ANTICOAGULATION MANAGEMENT ENCOUNTER: ICD-10-CM

## 2024-10-02 DIAGNOSIS — Z86.718 HISTORY OF DEEP VENOUS THROMBOSIS (DVT) OF DISTAL VEIN OF LEFT LOWER EXTREMITY: ICD-10-CM

## 2024-10-02 DIAGNOSIS — Z79.01 ANTICOAGULATION MANAGEMENT ENCOUNTER: ICD-10-CM

## 2024-10-02 PROCEDURE — 93793 ANTICOAG MGMT PT WARFARIN: CPT | Performed by: NURSE PRACTITIONER

## 2024-10-02 NOTE — PROGRESS NOTES
Anticoagulation Encounter     Indication for Warfarin/Diagnosis for anticoagulation -antiphospholipid syndrome and unprovoked left lower extremity DVT    INR 1.9  Goal INR- 2-3    Current dose -Coumadin 2.5 mg on Monday, Wednesday and Saturday then Coumadin 5 mg p.o. nightly the rest of the week    Patient instructions: Take Coumadin 5 mg p.o. x 1 tonight and then resume Coumadin 2.5 mg on Monday, Wednesday and Saturday then Coumadin 5 mg p.o. nightly the rest of the week.      Continue with current regimen    Next PT/INR  - 1 week with home monitoring system    Stephen Forman RN discussed recommendations with patient.

## 2024-10-11 ENCOUNTER — ANTI-COAG VISIT (OUTPATIENT)
Dept: HEMATOLOGY | Age: 49
End: 2024-10-11

## 2024-10-11 DIAGNOSIS — Z86.718 HISTORY OF DEEP VENOUS THROMBOSIS (DVT) OF DISTAL VEIN OF LEFT LOWER EXTREMITY: ICD-10-CM

## 2024-10-11 DIAGNOSIS — Z79.01 ANTICOAGULATION MANAGEMENT ENCOUNTER: ICD-10-CM

## 2024-10-11 DIAGNOSIS — R76.0 ANTIPHOSPHOLIPID ANTIBODY POSITIVE: Primary | ICD-10-CM

## 2024-10-11 DIAGNOSIS — Z51.81 ANTICOAGULATION MANAGEMENT ENCOUNTER: ICD-10-CM

## 2024-10-11 NOTE — PROGRESS NOTES
Anticoagulation Encounter     Indication for Warfarin/Diagnosis for anticoagulation -antiphospholipid syndrome and unprovoked left lower extremity DVT    INR 2.5  Goal INR- 2-3    Current dose -Coumadin 2.5 mg on Mondays, Wednesdays and Fridays then Coumadin 5 mg p.o. nightly the rest of the week    Patient instructions: Continue Coumadin 2.5 mg on Mondays, Wednesdays and Fridays then Coumadin 5 mg p.o. nightly the rest of the week    Continue with current regimen    Next PT/INR  - 1 week with home monitoring system    Stephen Forman RN discussed recommendations with patient.

## 2024-10-25 ENCOUNTER — ANTI-COAG VISIT (OUTPATIENT)
Dept: HEMATOLOGY | Age: 49
End: 2024-10-25

## 2024-10-25 DIAGNOSIS — Z79.01 ANTICOAGULATION MANAGEMENT ENCOUNTER: ICD-10-CM

## 2024-10-25 DIAGNOSIS — Z51.81 ENCOUNTER FOR MONITORING COUMADIN THERAPY: ICD-10-CM

## 2024-10-25 DIAGNOSIS — Z51.81 ANTICOAGULATION MANAGEMENT ENCOUNTER: ICD-10-CM

## 2024-10-25 DIAGNOSIS — Z79.01 ENCOUNTER FOR MONITORING COUMADIN THERAPY: ICD-10-CM

## 2024-10-25 DIAGNOSIS — Z86.718 HISTORY OF DEEP VENOUS THROMBOSIS (DVT) OF DISTAL VEIN OF LEFT LOWER EXTREMITY: ICD-10-CM

## 2024-10-25 DIAGNOSIS — R76.0 ANTIPHOSPHOLIPID ANTIBODY POSITIVE: Primary | ICD-10-CM

## 2024-10-25 NOTE — PROGRESS NOTES
Anticoagulation Encounter     Indication for Warfarin/Diagnosis for anticoagulation -antiphospholipid syndrome and unprovoked left lower extremity DVT    INR 3.0  Goal INR- 2-3    Current dose -Coumadin 2.5 mg on Mondays, Wednesdays and Fridays then Coumadin 5 mg p.o. nightly the rest of the week    Patient instructions: No change other than recommended to eat a serving of greens and continue Coumadin 2.5 mg on Mondays, Wednesdays and Fridays then Coumadin 5 mg p.o. nightly the rest of the week    Next PT/INR  - 1 week with home monitoring system    Stephen Forman RN discussed recommendations with patient.

## 2024-11-01 ENCOUNTER — ANTI-COAG VISIT (OUTPATIENT)
Dept: HEMATOLOGY | Age: 49
End: 2024-11-01

## 2024-11-01 DIAGNOSIS — Z86.718 HISTORY OF DEEP VENOUS THROMBOSIS (DVT) OF DISTAL VEIN OF LEFT LOWER EXTREMITY: ICD-10-CM

## 2024-11-01 DIAGNOSIS — Z79.01 ANTICOAGULATION MANAGEMENT ENCOUNTER: ICD-10-CM

## 2024-11-01 DIAGNOSIS — R76.0 ANTIPHOSPHOLIPID ANTIBODY POSITIVE: Primary | ICD-10-CM

## 2024-11-01 DIAGNOSIS — Z51.81 ANTICOAGULATION MANAGEMENT ENCOUNTER: ICD-10-CM

## 2024-11-01 NOTE — PROGRESS NOTES
Anticoagulation Encounter     Indication for Warfarin/Diagnosis for anticoagulation -antiphospholipid syndrome and unprovoked left lower extremity DVT    INR 2.7  Goal INR- 2-3    Current dose -Coumadin 2.5 mg on Mondays, Wednesdays and Fridays then Coumadin 5 mg p.o. nightly the rest of the week    Patient instructions: INR is therapeutic, continue Coumadin 2.5 mg on Mondays, Wednesdays and Fridays then Coumadin 5 mg p.o. nightly the rest of the week    Next PT/INR  - 1 week with home monitoring system    Stephen Forman RN discussed recommendations with patient.

## 2024-11-11 ENCOUNTER — ANTI-COAG VISIT (OUTPATIENT)
Dept: HEMATOLOGY | Age: 49
End: 2024-11-11
Payer: OTHER GOVERNMENT

## 2024-11-11 DIAGNOSIS — Z51.81 ANTICOAGULATION MANAGEMENT ENCOUNTER: ICD-10-CM

## 2024-11-11 DIAGNOSIS — Z79.01 ANTICOAGULATION MANAGEMENT ENCOUNTER: ICD-10-CM

## 2024-11-11 DIAGNOSIS — R76.0 ANTIPHOSPHOLIPID ANTIBODY POSITIVE: Primary | ICD-10-CM

## 2024-11-11 DIAGNOSIS — Z86.718 HISTORY OF DEEP VENOUS THROMBOSIS (DVT) OF DISTAL VEIN OF LEFT LOWER EXTREMITY: ICD-10-CM

## 2024-11-11 PROCEDURE — 93793 ANTICOAG MGMT PT WARFARIN: CPT | Performed by: NURSE PRACTITIONER

## 2024-11-11 NOTE — PROGRESS NOTES
Anticoagulation Encounter     Indication for Warfarin/Diagnosis for anticoagulation -antiphospholipid syndrome and unprovoked left lower extremity DVT    INR 2.4  Goal INR- 2-3    Current dose -Coumadin 2.5 mg on Mondays, Wednesdays and Fridays then Coumadin 5 mg p.o. nightly the rest of the week    Patient instructions: INR remains therapeutic, continue Coumadin 2.5 mg on Mondays, Wednesdays and Fridays then Coumadin 5 mg p.o. nightly the rest of the week    Next PT/INR  - 1 week with home monitoring system    Stephen Forman RN discussed recommendations with patient.

## 2024-11-15 ENCOUNTER — ANTI-COAG VISIT (OUTPATIENT)
Dept: HEMATOLOGY | Age: 49
End: 2024-11-15

## 2024-11-15 DIAGNOSIS — Z51.81 ANTICOAGULATION MANAGEMENT ENCOUNTER: ICD-10-CM

## 2024-11-15 DIAGNOSIS — Z86.718 HISTORY OF DEEP VENOUS THROMBOSIS (DVT) OF DISTAL VEIN OF LEFT LOWER EXTREMITY: ICD-10-CM

## 2024-11-15 DIAGNOSIS — R76.0 ANTIPHOSPHOLIPID ANTIBODY POSITIVE: Primary | ICD-10-CM

## 2024-11-15 DIAGNOSIS — Z79.01 ANTICOAGULATION MANAGEMENT ENCOUNTER: ICD-10-CM

## 2024-11-15 NOTE — PROGRESS NOTES
Anticoagulation Encounter     Indication for Warfarin/Diagnosis for anticoagulation -antiphospholipid syndrome and unprovoked left lower extremity DVT    INR 2.2  Goal INR- 2-3    Current dose -Coumadin 2.5 mg on Mondays, Wednesdays and Fridays then Coumadin 5 mg p.o. nightly the rest of the week    Patient instructions: INR remains therapeutic, no change, continue Coumadin 2.5 mg on Mondays, Wednesdays and Fridays then Coumadin 5 mg p.o. nightly the rest of the week    Next PT/INR  - 1 week with home monitoring system    Stephen Forman RN discussed recommendations with patient.

## 2024-11-22 ENCOUNTER — ANTI-COAG VISIT (OUTPATIENT)
Dept: HEMATOLOGY | Age: 49
End: 2024-11-22

## 2024-11-22 DIAGNOSIS — Z51.81 ANTICOAGULATION MANAGEMENT ENCOUNTER: ICD-10-CM

## 2024-11-22 DIAGNOSIS — R76.0 ANTIPHOSPHOLIPID ANTIBODY POSITIVE: Primary | ICD-10-CM

## 2024-11-22 DIAGNOSIS — Z79.01 ANTICOAGULATION MANAGEMENT ENCOUNTER: ICD-10-CM

## 2024-11-22 DIAGNOSIS — Z86.718 HISTORY OF DEEP VENOUS THROMBOSIS (DVT) OF DISTAL VEIN OF LEFT LOWER EXTREMITY: ICD-10-CM

## 2024-11-23 NOTE — PROGRESS NOTES
Anticoagulation Encounter     Indication for Warfarin/Diagnosis for anticoagulation -antiphospholipid syndrome and unprovoked left lower extremity DVT    INR 2.2, stable with no change from prior INR result  Goal INR- 2-3    Current dose -Coumadin 2.5 mg on Mondays, Wednesdays and Fridays then Coumadin 5 mg p.o. nightly the rest of the week    Patient instructions: INR remains therapeutic with no change, continue with current regimen: Coumadin 2.5 mg on Mondays, Wednesdays and Fridays then Coumadin 5 mg p.o. nightly the rest of the week    Next PT/INR  -10 days with home monitoring system    Stephen Forman RN discussed recommendations with patient.

## 2024-12-02 ENCOUNTER — ANTI-COAG VISIT (OUTPATIENT)
Dept: HEMATOLOGY | Age: 49
End: 2024-12-02
Payer: OTHER GOVERNMENT

## 2024-12-02 DIAGNOSIS — Z86.718 HISTORY OF DEEP VENOUS THROMBOSIS (DVT) OF DISTAL VEIN OF LEFT LOWER EXTREMITY: ICD-10-CM

## 2024-12-02 DIAGNOSIS — Z79.01 ANTICOAGULATION MANAGEMENT ENCOUNTER: ICD-10-CM

## 2024-12-02 DIAGNOSIS — Z51.81 ANTICOAGULATION MANAGEMENT ENCOUNTER: ICD-10-CM

## 2024-12-02 DIAGNOSIS — R76.0 ANTIPHOSPHOLIPID ANTIBODY POSITIVE: Primary | ICD-10-CM

## 2024-12-02 PROCEDURE — 93793 ANTICOAG MGMT PT WARFARIN: CPT | Performed by: NURSE PRACTITIONER

## 2024-12-02 NOTE — PROGRESS NOTES
Anticoagulation Encounter     Indication for Warfarin/Diagnosis for anticoagulation -antiphospholipid syndrome and unprovoked left lower extremity DVT    INR 3.1  Goal INR- 2-3    Current dose -Coumadin 2.5 mg on Mondays, Wednesdays and Fridays then Coumadin 5 mg p.o. nightly the rest of the week    Patient instructions: INR subtherapeutic.  Hold Coumadin tonight and then resume Coumadin 2.5 mg on Mondays, Wednesdays and Fridays then Coumadin 5 mg p.o. nightly the rest of the week    Next PT/INR  - 1 week with home monitoring system    Stephen Forman RN discussed recommendations with patient.

## 2024-12-09 ENCOUNTER — ANTI-COAG VISIT (OUTPATIENT)
Dept: HEMATOLOGY | Age: 49
End: 2024-12-09
Payer: OTHER GOVERNMENT

## 2024-12-09 DIAGNOSIS — Z86.718 HISTORY OF DEEP VENOUS THROMBOSIS (DVT) OF DISTAL VEIN OF LEFT LOWER EXTREMITY: ICD-10-CM

## 2024-12-09 DIAGNOSIS — R76.0 ANTIPHOSPHOLIPID ANTIBODY POSITIVE: Primary | ICD-10-CM

## 2024-12-09 DIAGNOSIS — Z51.81 ANTICOAGULATION MANAGEMENT ENCOUNTER: ICD-10-CM

## 2024-12-09 DIAGNOSIS — Z79.01 ANTICOAGULATION MANAGEMENT ENCOUNTER: ICD-10-CM

## 2024-12-09 PROCEDURE — 93793 ANTICOAG MGMT PT WARFARIN: CPT | Performed by: NURSE PRACTITIONER

## 2024-12-09 NOTE — PROGRESS NOTES
Anticoagulation Encounter     Indication for Warfarin/Diagnosis for anticoagulation -antiphospholipid syndrome and unprovoked left lower extremity DVT    INR 2.5  Goal INR- 2-3    Current dose -Coumadin 2.5 mg on Mondays, Wednesdays and Fridays then Coumadin 5 mg p.o. nightly the rest of the week    Patient instructions: Continue Coumadin 2.5 mg on Mondays, Wednesdays and Fridays then Coumadin 5 mg p.o. nightly the rest of the week    Next PT/INR  - 1 week with home monitoring system    Stephen Forman RN discussed recommendations with patient.

## 2024-12-23 ENCOUNTER — ANTI-COAG VISIT (OUTPATIENT)
Dept: HEMATOLOGY | Age: 49
End: 2024-12-23
Payer: OTHER GOVERNMENT

## 2024-12-23 DIAGNOSIS — Z51.81 ANTICOAGULATION MANAGEMENT ENCOUNTER: ICD-10-CM

## 2024-12-23 DIAGNOSIS — R76.0 ANTIPHOSPHOLIPID ANTIBODY POSITIVE: Primary | ICD-10-CM

## 2024-12-23 DIAGNOSIS — Z51.81 ENCOUNTER FOR MONITORING COUMADIN THERAPY: ICD-10-CM

## 2024-12-23 DIAGNOSIS — Z79.01 ENCOUNTER FOR MONITORING COUMADIN THERAPY: ICD-10-CM

## 2024-12-23 DIAGNOSIS — Z79.01 ANTICOAGULATION MANAGEMENT ENCOUNTER: ICD-10-CM

## 2024-12-23 DIAGNOSIS — Z86.718 HISTORY OF DEEP VENOUS THROMBOSIS (DVT) OF DISTAL VEIN OF LEFT LOWER EXTREMITY: ICD-10-CM

## 2024-12-23 PROCEDURE — 93793 ANTICOAG MGMT PT WARFARIN: CPT | Performed by: NURSE PRACTITIONER

## 2024-12-23 NOTE — PROGRESS NOTES
Anticoagulation Encounter     Indication for Warfarin/Diagnosis for anticoagulation -antiphospholipid syndrome and unprovoked left lower extremity DVT    INR 2.1  Goal INR- 2-3    Current dose -Coumadin 2.5 mg on Mondays, Wednesdays and Fridays then Coumadin 5 mg p.o. nightly the rest of the week    Patient instructions: Therapeutic INR ,Continue Coumadin 2.5 mg on Mondays, Wednesdays and Fridays then Coumadin 5 mg p.o. nightly the rest of the week    Next PT/INR  - 1 week with home monitoring system    Stephen Forman RN discussed recommendations with patient.

## 2024-12-31 ENCOUNTER — ANTI-COAG VISIT (OUTPATIENT)
Dept: HEMATOLOGY | Age: 49
End: 2024-12-31
Payer: OTHER GOVERNMENT

## 2024-12-31 DIAGNOSIS — Z79.01 ANTICOAGULATION MANAGEMENT ENCOUNTER: ICD-10-CM

## 2024-12-31 DIAGNOSIS — R76.0 ANTIPHOSPHOLIPID ANTIBODY POSITIVE: Primary | ICD-10-CM

## 2024-12-31 DIAGNOSIS — Z51.81 ANTICOAGULATION MANAGEMENT ENCOUNTER: ICD-10-CM

## 2024-12-31 DIAGNOSIS — Z86.718 HISTORY OF DEEP VENOUS THROMBOSIS (DVT) OF DISTAL VEIN OF LEFT LOWER EXTREMITY: ICD-10-CM

## 2024-12-31 PROCEDURE — 93793 ANTICOAG MGMT PT WARFARIN: CPT | Performed by: NURSE PRACTITIONER

## 2024-12-31 NOTE — PROGRESS NOTES
Anticoagulation Encounter     Indication for Warfarin/Diagnosis for anticoagulation -antiphospholipid syndrome and unprovoked left lower extremity DVT    INR 2.6  Goal INR- 2-3    Current dose -Coumadin 2.5 mg on Mondays, Wednesdays and Fridays then Coumadin 5 mg p.o. nightly the rest of the week    Patient instructions: INR remains therapeutic and recommend to continue Coumadin 2.5 mg on Mondays, Wednesdays and Fridays then Coumadin 5 mg p.o. nightly the rest of the week    Next PT/INR  - 1 week with home monitoring system    Stephen Forman RN discussed recommendations with patient.

## 2025-01-08 ENCOUNTER — ANTI-COAG VISIT (OUTPATIENT)
Dept: HEMATOLOGY | Age: 50
End: 2025-01-08
Payer: OTHER GOVERNMENT

## 2025-01-08 DIAGNOSIS — Z51.81 ANTICOAGULATION MANAGEMENT ENCOUNTER: ICD-10-CM

## 2025-01-08 DIAGNOSIS — Z86.718 HISTORY OF DEEP VENOUS THROMBOSIS (DVT) OF DISTAL VEIN OF LEFT LOWER EXTREMITY: ICD-10-CM

## 2025-01-08 DIAGNOSIS — Z79.01 ANTICOAGULATION MANAGEMENT ENCOUNTER: ICD-10-CM

## 2025-01-08 DIAGNOSIS — R76.0 ANTIPHOSPHOLIPID ANTIBODY POSITIVE: Primary | ICD-10-CM

## 2025-01-08 PROCEDURE — 93793 ANTICOAG MGMT PT WARFARIN: CPT | Performed by: NURSE PRACTITIONER

## 2025-01-08 NOTE — PROGRESS NOTES
Anticoagulation Encounter     Indication for Warfarin/Diagnosis for anticoagulation -antiphospholipid syndrome and unprovoked left lower extremity DVT    INR 2.2  Goal INR- 2-3    Current dose -Coumadin 2.5 mg on Mondays, Wednesdays and Fridays then Coumadin 5 mg p.o. nightly the rest of the week    Patient instructions: Therapeutic INR, continue with current regimen: Coumadin 2.5 mg on Mondays, Wednesdays and Fridays then Coumadin 5 mg p.o. nightly the rest of the week    Next PT/INR  - 1 week with home monitoring system    Stephen Forman RN discussed recommendations with patient.

## 2025-01-17 ENCOUNTER — ANTI-COAG VISIT (OUTPATIENT)
Dept: HEMATOLOGY | Age: 50
End: 2025-01-17

## 2025-01-17 DIAGNOSIS — Z79.01 ANTICOAGULATION MANAGEMENT ENCOUNTER: Primary | ICD-10-CM

## 2025-01-17 DIAGNOSIS — Z86.718 HISTORY OF DEEP VENOUS THROMBOSIS (DVT) OF DISTAL VEIN OF LEFT LOWER EXTREMITY: ICD-10-CM

## 2025-01-17 DIAGNOSIS — Z51.81 ENCOUNTER FOR MONITORING COUMADIN THERAPY: ICD-10-CM

## 2025-01-17 DIAGNOSIS — Z79.01 ENCOUNTER FOR MONITORING COUMADIN THERAPY: ICD-10-CM

## 2025-01-17 DIAGNOSIS — Z51.81 ANTICOAGULATION MANAGEMENT ENCOUNTER: Primary | ICD-10-CM

## 2025-01-17 NOTE — PROGRESS NOTES
Anticoagulation Encounter     Indication for Warfarin/Diagnosis for anticoagulation -antiphospholipid syndrome and unprovoked left lower extremity DVT    INR 2.2  Goal INR- 2-3    Current dose -Coumadin 2.5 mg on Mondays, Wednesdays and Fridays then Coumadin 5 mg p.o. nightly the rest of the week    Patient instructions: INR remains within normal limits, continue current regimeine: Coumadin 2.5 mg on Mondays, Wednesdays and Fridays then Coumadin 5 mg p.o. nightly the rest of the week    Next PT/INR  - 1 week with home monitoring system    Stephen Forman RN discussed recommendations with patient.

## 2025-01-21 ENCOUNTER — TELEPHONE (OUTPATIENT)
Dept: HEMATOLOGY | Age: 50
End: 2025-01-21

## 2025-01-21 NOTE — TELEPHONE ENCOUNTER
Called pt. to remind them of appointment on 01/23/2025 and had to leave a detailed voicemail with appointment date and time. Reminded patient to just come at appointment time, and to not come at the lab appointment time. Reminded patient that we will not check them in any more than 30 minutes before appointment time. We have now moved to the [facility] that is located between our old office and the ER at the [facility]. Letting the Pt know that our front entrance faces the [employer] fields and leaving our address. Reminded pt to eat well and be well hydrated for their labs.

## 2025-01-22 DIAGNOSIS — R79.89 ELEVATED FERRITIN: Primary | ICD-10-CM

## 2025-01-22 DIAGNOSIS — R74.8 ELEVATED LIVER ENZYMES: ICD-10-CM

## 2025-01-22 NOTE — PROGRESS NOTES
Progress note      Pt Name: Micky Pavon  YOB: 1975  MRN: [id number]    Date of evaluation: 01/23/2025  History Obtained From:  patient, electronic medical record    CHIEF COMPLAINT:    Chief Complaint   Patient presents with    Follow-up     Antiphospholipid syndrome (HCC)     HISTORY OF PRESENT ILLNESS:    Micky Pavon is a 50 y.o.  male who is currently being followed for antiphospholipid syndrome/lupus anticoagulant, identified after unprovoked acute DVT in the left lower extremity, history of elevated ferritin suspected to be reactive process, and subcentimeter right upper lobe lung nodule.  Recommendation is for long-term anticoagulation with Coumadin.  Micky returns today in scheduled follow-up for evaluation, lab monitoring, and further treatment recommendations.      History of Present Illness  Micky's INR has been overall therapeutic with the exception of a 1.69 today.  He has been managing his condition well, with no recent dose adjustments in over a month. He expresses a preference for monthly monitoring of his INR levels. He continues to experience leg swelling, which is somewhat alleviated by the use of compression socks and rest. However, he finds it challenging to maintain an elevated leg position at home due to his work commitments. He refutes experiencing any shortness of breath or chest pain.    [facility] with [facility] scheduled every 6 months unless he falls ill. A repeat CT [facility] of his chest [facility] due to follow-up on the subcentimeter right upper lobe lung nodule, he reported imaging showed slight growth but not significant enough to warrant a biopsy. He is scheduled for another CT scan in 05/2025.    MEDICATIONS  Coumadin       Today's clinic visit to include physical assessment, review of systems, any lab or radiographic

## 2025-01-23 ENCOUNTER — HOSPITAL ENCOUNTER (OUTPATIENT)
Dept: INFUSION THERAPY | Age: 50
Discharge: HOME | End: 2025-01-23
Payer: OTHER GOVERNMENT

## 2025-01-23 ENCOUNTER — OFFICE VISIT (OUTPATIENT)
Dept: HEMATOLOGY | Age: 50
End: 2025-01-23
Payer: OTHER GOVERNMENT

## 2025-01-23 VITALS
HEART RATE: 71 BPM | SYSTOLIC BLOOD PRESSURE: 134 MMHG | DIASTOLIC BLOOD PRESSURE: 88 MMHG | TEMPERATURE: 97.9 F | HEIGHT: 72 IN | WEIGHT: 237.7 LBS | BODY MASS INDEX: 32.2 KG/M2 | OXYGEN SATURATION: 97 %

## 2025-01-23 DIAGNOSIS — Z86.718 HISTORY OF DEEP VENOUS THROMBOSIS (DVT) OF DISTAL VEIN OF LEFT LOWER EXTREMITY: Primary | ICD-10-CM

## 2025-01-23 DIAGNOSIS — Z51.81 ENCOUNTER FOR MONITORING COUMADIN THERAPY: ICD-10-CM

## 2025-01-23 DIAGNOSIS — R74.8 ELEVATED LIVER ENZYMES: ICD-10-CM

## 2025-01-23 DIAGNOSIS — R76.0 ANTIPHOSPHOLIPID ANTIBODY POSITIVE: ICD-10-CM

## 2025-01-23 DIAGNOSIS — R79.89 ELEVATED FERRITIN: ICD-10-CM

## 2025-01-23 DIAGNOSIS — Z51.81 ANTICOAGULATION MANAGEMENT ENCOUNTER: ICD-10-CM

## 2025-01-23 DIAGNOSIS — Z51.81 ANTICOAGULATION MANAGEMENT ENCOUNTER: Primary | ICD-10-CM

## 2025-01-23 DIAGNOSIS — R91.1 NODULE OF UPPER LOBE OF RIGHT LUNG: ICD-10-CM

## 2025-01-23 DIAGNOSIS — Z79.01 ENCOUNTER FOR MONITORING COUMADIN THERAPY: ICD-10-CM

## 2025-01-23 DIAGNOSIS — Z79.01 ANTICOAGULATION MANAGEMENT ENCOUNTER: ICD-10-CM

## 2025-01-23 DIAGNOSIS — Z79.01 ANTICOAGULATION MANAGEMENT ENCOUNTER: Primary | ICD-10-CM

## 2025-01-23 LAB
ALBUMIN SERPL-MCNC: 4.4 G/DL (ref 3.5–5.2)
ALP SERPL-CCNC: 93 U/L (ref 40–129)
ALT SERPL-CCNC: 36 U/L (ref 5–41)
ANION GAP SERPL CALCULATED.3IONS-SCNC: 10 MMOL/L (ref 7–19)
AST SERPL-CCNC: 27 U/L (ref 5–40)
BASOPHILS # BLD: 0.02 K/UL (ref 0–0.2)
BASOPHILS NFR BLD: 0.3 % (ref 0–1)
BILIRUB SERPL-MCNC: 0.9 MG/DL (ref 0–1.2)
BUN SERPL-MCNC: 15 MG/DL (ref 6–20)
CALCIUM SERPL-MCNC: 9.1 MG/DL (ref 8.6–10)
CHLORIDE SERPL-SCNC: 104 MMOL/L (ref 98–107)
CO2 SERPL-SCNC: 27 MMOL/L (ref 22–29)
CREAT SERPL-MCNC: 0.9 MG/DL (ref 0.7–1.2)
EOSINOPHIL # BLD: 0.1 K/UL (ref 0–0.6)
EOSINOPHIL NFR BLD: 1.7 % (ref 0–5)
ERYTHROCYTE [DISTWIDTH] IN BLOOD BY AUTOMATED COUNT: 13.1 % (ref 11.5–14.5)
FERRITIN SERPL-MCNC: 491.5 NG/ML (ref 30–400)
GLUCOSE SERPL-MCNC: 107 MG/DL (ref 70–99)
HCT VFR BLD AUTO: 46 % (ref 42–52)
HGB BLD-MCNC: 15.7 G/DL (ref 14–18)
INR PPP: 1.69 (ref 0.88–1.18)
LYMPHOCYTES # BLD: 2.71 K/UL (ref 1.1–4.5)
LYMPHOCYTES NFR BLD: 47.2 % (ref 20–40)
MCH RBC QN AUTO: 27.6 PG (ref 27–31)
MCHC RBC AUTO-ENTMCNC: 34.1 G/DL (ref 33–37)
MCV RBC AUTO: 80.8 FL (ref 80–94)
MONOCYTES # BLD: 0.57 K/UL (ref 0–0.9)
MONOCYTES NFR BLD: 9.9 % (ref 1–10)
NEUTROPHILS # BLD: 2.33 K/UL (ref 1.5–7.5)
NEUTS SEG NFR BLD: 40.7 % (ref 50–65)
PLATELET # BLD AUTO: 209 K/UL (ref 130–400)
PMV BLD AUTO: 10.6 FL (ref 9.4–12.4)
POTASSIUM SERPL-SCNC: 4.3 MMOL/L (ref 3.5–5.1)
PROT SERPL-MCNC: 7.9 G/DL (ref 6.4–8.3)
PROTHROMBIN TIME: 19.4 SEC (ref 12–14.6)
RBC # BLD AUTO: 5.69 M/UL (ref 4.7–6.1)
SODIUM SERPL-SCNC: 141 MMOL/L (ref 136–145)
WBC # BLD AUTO: 5.74 K/UL (ref 4.8–10.8)

## 2025-01-23 PROCEDURE — 99212 OFFICE O/P EST SF 10 MIN: CPT

## 2025-01-23 PROCEDURE — 85025 COMPLETE CBC W/AUTO DIFF WBC: CPT

## 2025-01-23 PROCEDURE — 82728 ASSAY OF FERRITIN: CPT

## 2025-01-23 PROCEDURE — 80053 COMPREHEN METABOLIC PANEL: CPT

## 2025-01-23 PROCEDURE — 85610 PROTHROMBIN TIME: CPT

## 2025-01-23 PROCEDURE — 36415 COLL VENOUS BLD VENIPUNCTURE: CPT

## 2025-01-31 ENCOUNTER — ANTI-COAG VISIT (OUTPATIENT)
Dept: HEMATOLOGY | Age: 50
End: 2025-01-31
Payer: OTHER GOVERNMENT

## 2025-01-31 DIAGNOSIS — R76.0 ANTIPHOSPHOLIPID ANTIBODY POSITIVE: ICD-10-CM

## 2025-01-31 DIAGNOSIS — Z79.01 ANTICOAGULATION MANAGEMENT ENCOUNTER: ICD-10-CM

## 2025-01-31 DIAGNOSIS — Z51.81 ANTICOAGULATION MANAGEMENT ENCOUNTER: ICD-10-CM

## 2025-01-31 DIAGNOSIS — Z86.718 HISTORY OF DEEP VENOUS THROMBOSIS (DVT) OF DISTAL VEIN OF LEFT LOWER EXTREMITY: Primary | ICD-10-CM

## 2025-01-31 PROCEDURE — 93793 ANTICOAG MGMT PT WARFARIN: CPT | Performed by: NURSE PRACTITIONER

## 2025-01-31 NOTE — PROGRESS NOTES
Anticoagulation Encounter     Indication for Warfarin/Diagnosis for anticoagulation -antiphospholipid syndrome and unprovoked left lower extremity DVT    INR 2.7  Goal INR- 2-3    Current dose -Coumadin 2.5 mg on Mondays, Wednesdays and Fridays then Coumadin 5 mg p.o. nightly the rest of the week    Patient instructions: Continue current regimen, INR is therapeutic.: Coumadin 2.5 mg on Mondays, Wednesdays and Fridays then Coumadin 5 mg p.o. nightly the rest of the week    Next PT/INR  - 1 week with home monitoring system    Stephen Forman RN discussed recommendations with patient.

## 2025-02-07 ENCOUNTER — ANTI-COAG VISIT (OUTPATIENT)
Dept: HEMATOLOGY | Age: 50
End: 2025-02-07
Payer: OTHER GOVERNMENT

## 2025-02-07 DIAGNOSIS — Z79.01 ANTICOAGULATION MANAGEMENT ENCOUNTER: ICD-10-CM

## 2025-02-07 DIAGNOSIS — Z86.718 HISTORY OF DEEP VENOUS THROMBOSIS (DVT) OF DISTAL VEIN OF LEFT LOWER EXTREMITY: Primary | ICD-10-CM

## 2025-02-07 DIAGNOSIS — Z51.81 ANTICOAGULATION MANAGEMENT ENCOUNTER: ICD-10-CM

## 2025-02-07 DIAGNOSIS — R76.0 ANTIPHOSPHOLIPID ANTIBODY POSITIVE: ICD-10-CM

## 2025-02-07 DIAGNOSIS — Z79.01 LONG TERM CURRENT USE OF ANTICOAGULANT: ICD-10-CM

## 2025-02-07 DIAGNOSIS — R76.0 ANTIPHOSPHOLIPID ANTIBODY POSITIVE: Primary | ICD-10-CM

## 2025-02-07 PROCEDURE — 93793 ANTICOAG MGMT PT WARFARIN: CPT | Performed by: NURSE PRACTITIONER

## 2025-02-07 NOTE — PROGRESS NOTES
Anticoagulation Encounter     Indication for Warfarin/Diagnosis for anticoagulation -antiphospholipid syndrome and unprovoked left lower extremity DVT    INR 2.2  Goal INR- 2-3    Current dose -Coumadin 2.5 mg on Mondays, Wednesdays and Fridays then Coumadin 5 mg p.o. nightly the rest of the week    Patient instructions: INR is therapeutic.  Continue with current regimen: Coumadin 2.5 mg on Mondays, Wednesdays and Fridays then Coumadin 5 mg p.o. nightly the rest of the week    Next PT/INR  -4 week     Stephen Forman RN discussed recommendations with patient.

## 2025-03-05 ENCOUNTER — ANTI-COAG VISIT (OUTPATIENT)
Dept: HEMATOLOGY | Age: 50
End: 2025-03-05
Payer: OTHER GOVERNMENT

## 2025-03-05 ENCOUNTER — HOSPITAL ENCOUNTER (OUTPATIENT)
Dept: INFUSION THERAPY | Age: 50
Discharge: HOME | End: 2025-03-05
Payer: OTHER GOVERNMENT

## 2025-03-05 DIAGNOSIS — D68.61 ANTIPHOSPHOLIPID SYNDROME: Primary | ICD-10-CM

## 2025-03-05 DIAGNOSIS — R76.0 ANTIPHOSPHOLIPID ANTIBODY POSITIVE: ICD-10-CM

## 2025-03-05 DIAGNOSIS — Z86.718 HISTORY OF DEEP VENOUS THROMBOSIS (DVT) OF DISTAL VEIN OF LEFT LOWER EXTREMITY: ICD-10-CM

## 2025-03-05 DIAGNOSIS — I82.432 ACUTE DEEP VEIN THROMBOSIS (DVT) OF POPLITEAL VEIN OF LEFT LOWER EXTREMITY (HCC): ICD-10-CM

## 2025-03-05 DIAGNOSIS — D68.61 ANTIPHOSPHOLIPID SYNDROME: ICD-10-CM

## 2025-03-05 DIAGNOSIS — Z79.01 LONG TERM CURRENT USE OF ANTICOAGULANT: ICD-10-CM

## 2025-03-05 DIAGNOSIS — Z51.81 ANTICOAGULATION MANAGEMENT ENCOUNTER: ICD-10-CM

## 2025-03-05 DIAGNOSIS — Z79.01 ANTICOAGULATION MANAGEMENT ENCOUNTER: ICD-10-CM

## 2025-03-05 LAB
INR PPP: 2.17 (ref 0.88–1.18)
PROTHROMBIN TIME: 23.6 SEC (ref 12–14.6)

## 2025-03-05 PROCEDURE — 93793 ANTICOAG MGMT PT WARFARIN: CPT | Performed by: NURSE PRACTITIONER

## 2025-03-05 PROCEDURE — 85610 PROTHROMBIN TIME: CPT

## 2025-03-05 PROCEDURE — 36415 COLL VENOUS BLD VENIPUNCTURE: CPT

## 2025-03-05 RX ORDER — WARFARIN SODIUM 5 MG/1
5 TABLET ORAL DAILY
Qty: 60 TABLET | Refills: 5 | Status: SHIPPED | OUTPATIENT
Start: 2025-03-05

## 2025-03-05 NOTE — PROGRESS NOTES
Anticoagulation Encounter     Indication for Warfarin/Diagnosis for anticoagulation -antiphospholipid syndrome and unprovoked left lower extremity DVT    INR 2.17  Goal INR- 2-3    Current dose -Coumadin 2.5 mg on Mondays, Wednesdays and Fridays then Coumadin 5 mg p.o. nightly the rest of the week    Patient instructions: INR remains therapeutic, continue with current regimen: Coumadin 2.5 mg on Mondays, Wednesdays and Fridays then Coumadin 5 mg p.o. nightly the rest of the week    Next PT/INR  -4 week     Stephen Forman RN discussed recommendations with patient.

## 2025-04-02 ENCOUNTER — HOSPITAL ENCOUNTER (OUTPATIENT)
Dept: INFUSION THERAPY | Age: 50
Discharge: HOME | End: 2025-04-02
Payer: OTHER GOVERNMENT

## 2025-04-02 DIAGNOSIS — R76.0 ANTIPHOSPHOLIPID ANTIBODY POSITIVE: ICD-10-CM

## 2025-04-02 DIAGNOSIS — Z79.01 LONG TERM CURRENT USE OF ANTICOAGULANT: ICD-10-CM

## 2025-04-02 LAB
INR PPP: 2.28 (ref 0.88–1.18)
PROTHROMBIN TIME: 24.6 SEC (ref 12–14.6)

## 2025-04-02 PROCEDURE — 85610 PROTHROMBIN TIME: CPT

## 2025-04-02 PROCEDURE — 36415 COLL VENOUS BLD VENIPUNCTURE: CPT

## 2025-04-30 ENCOUNTER — ANTI-COAG VISIT (OUTPATIENT)
Dept: HEMATOLOGY | Age: 50
End: 2025-04-30
Payer: OTHER GOVERNMENT

## 2025-04-30 ENCOUNTER — HOSPITAL ENCOUNTER (OUTPATIENT)
Dept: INFUSION THERAPY | Age: 50
Discharge: HOME | End: 2025-04-30
Payer: OTHER GOVERNMENT

## 2025-04-30 DIAGNOSIS — Z79.01 ANTICOAGULATION MANAGEMENT ENCOUNTER: ICD-10-CM

## 2025-04-30 DIAGNOSIS — Z51.81 ANTICOAGULATION MANAGEMENT ENCOUNTER: ICD-10-CM

## 2025-04-30 DIAGNOSIS — R76.0 ANTIPHOSPHOLIPID ANTIBODY POSITIVE: ICD-10-CM

## 2025-04-30 DIAGNOSIS — R76.0 ANTIPHOSPHOLIPID ANTIBODY POSITIVE: Primary | ICD-10-CM

## 2025-04-30 DIAGNOSIS — Z79.01 LONG TERM CURRENT USE OF ANTICOAGULANT: ICD-10-CM

## 2025-04-30 LAB
INR PPP: 2.02 (ref 0.88–1.18)
PROTHROMBIN TIME: 22.6 SEC (ref 12–14.6)

## 2025-04-30 PROCEDURE — 36415 COLL VENOUS BLD VENIPUNCTURE: CPT

## 2025-04-30 PROCEDURE — 93793 ANTICOAG MGMT PT WARFARIN: CPT | Performed by: NURSE PRACTITIONER

## 2025-04-30 PROCEDURE — 85610 PROTHROMBIN TIME: CPT

## 2025-04-30 NOTE — PROGRESS NOTES
Anticoagulation Encounter     Indication for Warfarin/Diagnosis for anticoagulation -antiphospholipid syndrome and unprovoked left lower extremity DVT    INR 2.02  Goal INR- 2-3    Current dose -Coumadin 2.5 mg on Mondays, Wednesdays and Fridays then Coumadin 5 mg p.o. nightly the rest of the week    Patient instructions: Therapeutic INR and recommended to continue with current regimen: Coumadin 2.5 mg on Mondays, Wednesdays and Fridays then Coumadin 5 mg p.o. nightly the rest of the week    Next PT/INR  -4 week     Stephen Forman RN discussed recommendations with patient.

## 2025-06-18 ENCOUNTER — HOSPITAL ENCOUNTER (OUTPATIENT)
Dept: INFUSION THERAPY | Age: 50
Discharge: HOME | End: 2025-06-18
Payer: OTHER GOVERNMENT

## 2025-06-18 DIAGNOSIS — Z79.01 LONG TERM CURRENT USE OF ANTICOAGULANT: ICD-10-CM

## 2025-06-18 DIAGNOSIS — R76.0 ANTIPHOSPHOLIPID ANTIBODY POSITIVE: ICD-10-CM

## 2025-06-18 LAB
INR PPP: 2.35 (ref 0.88–1.18)
PROTHROMBIN TIME: 25.3 SEC (ref 12–14.6)

## 2025-06-18 PROCEDURE — 36415 COLL VENOUS BLD VENIPUNCTURE: CPT

## 2025-06-18 PROCEDURE — 85610 PROTHROMBIN TIME: CPT

## 2025-06-19 ENCOUNTER — ANTI-COAG VISIT (OUTPATIENT)
Dept: HEMATOLOGY | Age: 50
End: 2025-06-19
Payer: OTHER GOVERNMENT

## 2025-06-19 DIAGNOSIS — Z79.01 ANTICOAGULATION MANAGEMENT ENCOUNTER: ICD-10-CM

## 2025-06-19 DIAGNOSIS — Z51.81 ANTICOAGULATION MANAGEMENT ENCOUNTER: ICD-10-CM

## 2025-06-19 DIAGNOSIS — Z86.718 HISTORY OF DEEP VENOUS THROMBOSIS (DVT) OF DISTAL VEIN OF LEFT LOWER EXTREMITY: ICD-10-CM

## 2025-06-19 DIAGNOSIS — R76.0 ANTIPHOSPHOLIPID ANTIBODY POSITIVE: Primary | ICD-10-CM

## 2025-06-19 PROCEDURE — 93793 ANTICOAG MGMT PT WARFARIN: CPT | Performed by: NURSE PRACTITIONER

## 2025-06-19 NOTE — PROGRESS NOTES
Anticoagulation Encounter     Indication for Warfarin/Diagnosis for anticoagulation -antiphospholipid syndrome and unprovoked left lower extremity DVT    INR 2.35  Goal INR- 2-3    Current dose -Coumadin 2.5 mg on Mondays, Wednesdays and Fridays then Coumadin 5 mg p.o. nightly the rest of the week    Patient instructions: INR remains therapeutic and will continue with current regimen: Coumadin 2.5 mg on Mondays, Wednesdays and Fridays then Coumadin 5 mg p.o. nightly the rest of the week    Next PT/INR  -4 week     Stephen Forman RN discussed recommendations with patient.

## 2025-07-18 ENCOUNTER — TELEPHONE (OUTPATIENT)
Dept: HEMATOLOGY | Age: 50
End: 2025-07-18

## 2025-07-22 DIAGNOSIS — R79.89 ELEVATED FERRITIN: Primary | ICD-10-CM

## 2025-07-22 DIAGNOSIS — Z79.899 MEDICATION MANAGEMENT: ICD-10-CM

## 2025-07-22 NOTE — PROGRESS NOTES
Progress note      Pt Name: Micky Pavon  YOB: 1975  MRN: [id number]    Date of evaluation: 07/23/2025  History Obtained From:  patient, electronic medical record    CHIEF COMPLAINT:    Chief Complaint   Patient presents with    Follow-up     History of deep venous thrombosis (DVT) of distal vein of left lower extremity    Anticoagulation     Coumadin management     HISTORY OF PRESENT ILLNESS:    Micky Pavon is a 50 y.o.  male who is currently being followed for antiphospholipid syndrome/lupus anticoagulant, identified after unprovoked acute DVT in the left lower extremity, history of elevated ferritin suspected to be reactive process, and subcentimeter right upper lobe lung nodule.  Recommendation is for long-term anticoagulation with Coumadin.  Micky returns today in scheduled follow-up for evaluation, lab monitoring, and further treatment recommendations.      History of Present Illness  He reports no new health issues since his last visit.  He is managing his anticoagulation with Coumadin well and routinely maintains a therapeutic INR. He continues to experience intermittent swelling in his left leg, which he manages by wearing compression socks daily at work. This approach seems to alleviate the swelling, although he still notices an indentation around his sock line.    [facility] and has an upcoming annual appointment in 2 to 3 weeks. He underwent a CT scan of [facility], which showed no further growth. [facility].       Today's clinic visit to include physical assessment, review of systems, any lab or radiographic findings that were available and plan of care are documented below.    HEMATOLOGIC HISTORY:   Diagnosis:  Unprovoked deep vein thrombus of the left lower extremity within the distal superficial femoral,

## 2025-07-23 ENCOUNTER — OFFICE VISIT (OUTPATIENT)
Dept: HEMATOLOGY | Age: 50
End: 2025-07-23
Payer: OTHER GOVERNMENT

## 2025-07-23 ENCOUNTER — HOSPITAL ENCOUNTER (OUTPATIENT)
Dept: INFUSION THERAPY | Age: 50
Discharge: HOME | End: 2025-07-23
Payer: OTHER GOVERNMENT

## 2025-07-23 VITALS
SYSTOLIC BLOOD PRESSURE: 132 MMHG | BODY MASS INDEX: 31.21 KG/M2 | HEART RATE: 74 BPM | HEIGHT: 72 IN | TEMPERATURE: 97.2 F | WEIGHT: 230.4 LBS | DIASTOLIC BLOOD PRESSURE: 68 MMHG | OXYGEN SATURATION: 98 %

## 2025-07-23 DIAGNOSIS — R79.89 ELEVATED FERRITIN: ICD-10-CM

## 2025-07-23 DIAGNOSIS — R76.0 ANTIPHOSPHOLIPID ANTIBODY POSITIVE: ICD-10-CM

## 2025-07-23 DIAGNOSIS — R76.0 ANTIPHOSPHOLIPID ANTIBODY POSITIVE: Primary | ICD-10-CM

## 2025-07-23 DIAGNOSIS — Z79.01 ANTICOAGULATION MANAGEMENT ENCOUNTER: ICD-10-CM

## 2025-07-23 DIAGNOSIS — Z79.01 LONG TERM CURRENT USE OF ANTICOAGULANT: ICD-10-CM

## 2025-07-23 DIAGNOSIS — Z51.81 ANTICOAGULATION MANAGEMENT ENCOUNTER: ICD-10-CM

## 2025-07-23 DIAGNOSIS — Z79.899 MEDICATION MANAGEMENT: ICD-10-CM

## 2025-07-23 DIAGNOSIS — R91.1 NODULE OF UPPER LOBE OF RIGHT LUNG: ICD-10-CM

## 2025-07-23 DIAGNOSIS — Z86.718 HISTORY OF DEEP VENOUS THROMBOSIS (DVT) OF DISTAL VEIN OF LEFT LOWER EXTREMITY: ICD-10-CM

## 2025-07-23 LAB
ALBUMIN SERPL-MCNC: 4.4 G/DL (ref 3.5–5.2)
ALP SERPL-CCNC: 89 U/L (ref 40–129)
ALT SERPL-CCNC: 29 U/L (ref 5–41)
ANION GAP SERPL CALCULATED.3IONS-SCNC: 11 MMOL/L (ref 7–19)
AST SERPL-CCNC: 29 U/L (ref 5–40)
BASOPHILS # BLD: 0.01 K/UL (ref 0–0.2)
BASOPHILS NFR BLD: 0.2 % (ref 0–1)
BILIRUB SERPL-MCNC: 0.9 MG/DL (ref 0–1.2)
BUN SERPL-MCNC: 12 MG/DL (ref 6–20)
CALCIUM SERPL-MCNC: 8.9 MG/DL (ref 8.6–10)
CHLORIDE SERPL-SCNC: 103 MMOL/L (ref 98–107)
CO2 SERPL-SCNC: 26 MMOL/L (ref 22–29)
CREAT SERPL-MCNC: 0.8 MG/DL (ref 0.7–1.2)
EOSINOPHIL # BLD: 0.06 K/UL (ref 0–0.6)
EOSINOPHIL NFR BLD: 1.2 % (ref 0–5)
ERYTHROCYTE [DISTWIDTH] IN BLOOD BY AUTOMATED COUNT: 13.2 % (ref 11.5–14.5)
FERRITIN SERPL-MCNC: 358 NG/ML (ref 30–400)
GLUCOSE SERPL-MCNC: 123 MG/DL (ref 70–99)
HCT VFR BLD AUTO: 44.4 % (ref 42–52)
HGB BLD-MCNC: 15 G/DL (ref 14–18)
INR PPP: 2.06 (ref 0.88–1.18)
LYMPHOCYTES # BLD: 1.92 K/UL (ref 1.1–4.5)
LYMPHOCYTES NFR BLD: 38.6 % (ref 20–40)
MCH RBC QN AUTO: 27.8 PG (ref 27–31)
MCHC RBC AUTO-ENTMCNC: 33.8 G/DL (ref 33–37)
MCV RBC AUTO: 82.2 FL (ref 80–94)
MONOCYTES # BLD: 0.47 K/UL (ref 0–0.9)
MONOCYTES NFR BLD: 9.4 % (ref 1–10)
NEUTROPHILS # BLD: 2.5 K/UL (ref 1.5–7.5)
NEUTS SEG NFR BLD: 50.2 % (ref 50–65)
PLATELET # BLD AUTO: 201 K/UL (ref 130–400)
PMV BLD AUTO: 11 FL (ref 9.4–12.4)
POTASSIUM SERPL-SCNC: 4 MMOL/L (ref 3.5–5.1)
PROT SERPL-MCNC: 7.6 G/DL (ref 6.4–8.3)
PROTHROMBIN TIME: 22.7 SEC (ref 12–14.6)
RBC # BLD AUTO: 5.4 M/UL (ref 4.7–6.1)
SODIUM SERPL-SCNC: 140 MMOL/L (ref 136–145)
WBC # BLD AUTO: 4.98 K/UL (ref 4.8–10.8)

## 2025-07-23 PROCEDURE — 82728 ASSAY OF FERRITIN: CPT

## 2025-07-23 PROCEDURE — 99214 OFFICE O/P EST MOD 30 MIN: CPT | Performed by: NURSE PRACTITIONER

## 2025-07-23 PROCEDURE — 80053 COMPREHEN METABOLIC PANEL: CPT

## 2025-07-23 PROCEDURE — 85610 PROTHROMBIN TIME: CPT

## 2025-07-23 PROCEDURE — 99212 OFFICE O/P EST SF 10 MIN: CPT

## 2025-07-23 PROCEDURE — 85025 COMPLETE CBC W/AUTO DIFF WBC: CPT

## 2025-07-23 PROCEDURE — 36415 COLL VENOUS BLD VENIPUNCTURE: CPT

## 2025-07-23 RX ORDER — ATORVASTATIN CALCIUM 10 MG/1
5 TABLET, FILM COATED ORAL DAILY
COMMUNITY

## 2025-08-20 ENCOUNTER — ANTI-COAG VISIT (OUTPATIENT)
Dept: HEMATOLOGY | Age: 50
End: 2025-08-20
Payer: OTHER GOVERNMENT

## 2025-08-20 ENCOUNTER — HOSPITAL ENCOUNTER (OUTPATIENT)
Dept: INFUSION THERAPY | Age: 50
Discharge: HOME | End: 2025-08-20
Payer: OTHER GOVERNMENT

## 2025-08-20 DIAGNOSIS — Z86.718 HISTORY OF DEEP VENOUS THROMBOSIS (DVT) OF DISTAL VEIN OF LEFT LOWER EXTREMITY: ICD-10-CM

## 2025-08-20 DIAGNOSIS — Z51.81 ANTICOAGULATION MANAGEMENT ENCOUNTER: ICD-10-CM

## 2025-08-20 DIAGNOSIS — R76.0 ANTIPHOSPHOLIPID ANTIBODY POSITIVE: Primary | ICD-10-CM

## 2025-08-20 DIAGNOSIS — R76.0 ANTIPHOSPHOLIPID ANTIBODY POSITIVE: ICD-10-CM

## 2025-08-20 DIAGNOSIS — Z79.01 LONG TERM CURRENT USE OF ANTICOAGULANT: ICD-10-CM

## 2025-08-20 DIAGNOSIS — Z79.01 ANTICOAGULATION MANAGEMENT ENCOUNTER: ICD-10-CM

## 2025-08-20 LAB
INR PPP: 2.27 (ref 0.88–1.18)
PROTHROMBIN TIME: 24.6 SEC (ref 12–14.6)

## 2025-08-20 PROCEDURE — 93793 ANTICOAG MGMT PT WARFARIN: CPT | Performed by: NURSE PRACTITIONER

## 2025-08-20 PROCEDURE — 36415 COLL VENOUS BLD VENIPUNCTURE: CPT

## 2025-08-20 PROCEDURE — 85610 PROTHROMBIN TIME: CPT
